# Patient Record
Sex: MALE | Race: WHITE | NOT HISPANIC OR LATINO | URBAN - METROPOLITAN AREA
[De-identification: names, ages, dates, MRNs, and addresses within clinical notes are randomized per-mention and may not be internally consistent; named-entity substitution may affect disease eponyms.]

---

## 2019-09-05 ENCOUNTER — OFFICE VISIT (OUTPATIENT)
Dept: NEPHROLOGY | Facility: CLINIC | Age: 64
End: 2019-09-05
Payer: COMMERCIAL

## 2019-09-05 VITALS
HEIGHT: 73 IN | SYSTOLIC BLOOD PRESSURE: 128 MMHG | WEIGHT: 226 LBS | BODY MASS INDEX: 29.95 KG/M2 | DIASTOLIC BLOOD PRESSURE: 74 MMHG

## 2019-09-05 DIAGNOSIS — I10 ESSENTIAL HYPERTENSION: Primary | ICD-10-CM

## 2019-09-05 DIAGNOSIS — Z12.5 ENCOUNTER FOR PROSTATE CANCER SCREENING: ICD-10-CM

## 2019-09-05 DIAGNOSIS — E78.00 PURE HYPERCHOLESTEROLEMIA: ICD-10-CM

## 2019-09-05 PROBLEM — H91.90 HEARING LOSS: Status: ACTIVE | Noted: 2019-09-05

## 2019-09-05 PROBLEM — N40.0 BENIGN PROSTATIC HYPERPLASIA WITHOUT LOWER URINARY TRACT SYMPTOMS: Status: ACTIVE | Noted: 2019-09-05

## 2019-09-05 PROCEDURE — 3074F SYST BP LT 130 MM HG: CPT | Performed by: INTERNAL MEDICINE

## 2019-09-05 PROCEDURE — 99214 OFFICE O/P EST MOD 30 MIN: CPT | Performed by: INTERNAL MEDICINE

## 2019-09-05 PROCEDURE — 3078F DIAST BP <80 MM HG: CPT | Performed by: INTERNAL MEDICINE

## 2019-09-05 RX ORDER — TAMSULOSIN HYDROCHLORIDE 0.4 MG/1
0.4 CAPSULE ORAL DAILY
COMMUNITY
Start: 2019-08-28 | End: 2019-10-31 | Stop reason: SDUPTHER

## 2019-09-05 RX ORDER — METOPROLOL SUCCINATE 25 MG/1
25 TABLET, EXTENDED RELEASE ORAL DAILY
COMMUNITY
Start: 2019-08-09 | End: 2020-04-28 | Stop reason: SDUPTHER

## 2019-09-05 RX ORDER — LISINOPRIL 10 MG/1
10 TABLET ORAL DAILY
COMMUNITY
Start: 2019-07-24 | End: 2019-10-31 | Stop reason: SDUPTHER

## 2019-09-05 NOTE — PROGRESS NOTES
[facility] [place], [place]    Name: Chandler Hayes  YOB: 1955      Assessment/Plan:    Hearing loss  Wears hearing aids    Benign prostatic hyperplasia without lower urinary tract symptoms  Continue tamsulosin    Encounter for prostate cancer screening  Check a free and total PSA         Problem List Items Addressed This Visit        Other    Encounter for prostate cancer screening     Check a free and total PSA         Relevant Orders    PSA, total and free      Other Visit Diagnoses     Essential hypertension    -  Primary    stable on medications    Relevant Medications    lisinopril (ZESTRIL) 10 mg tablet    metoprolol succinate (TOPROL-XL) 25 mg 24 hr tablet    Other Relevant Orders    CBC and differential    Comprehensive metabolic panel    Urinalysis with microscopic    Microalbumin / creatinine urine ratio    Pure hypercholesterolemia        Relevant Orders    Lipid panel            Subjective:      Patient ID: Chandler Hayes is a 59 y o  male  HPI He is taking lisinopril and metoprolol for hypertension and takes tamsulosin for BPH without lower urinary tract symptoms  He is feeling well and has no side effects  He just retired and feels great  He is exercising and kayaking  The following portions of the patient's history were reviewed and updated as appropriate: allergies, current medications, past family history, past medical history, past social history, past surgical history and problem list     Review of Systems   Constitutional: Positive for activity change and unexpected weight change  Negative for appetite change, chills and fatigue  HENT: Positive for hearing loss  Eyes: Negative  Respiratory: Negative  Cardiovascular: Negative  Gastrointestinal: Negative  Endocrine: Negative  Genitourinary: Negative  Musculoskeletal: Negative  Skin: Negative  Allergic/Immunologic: Negative  Hematological: Negative  Psychiatric/Behavioral: Negative  Social History     Socioeconomic History    Marital status: /Civil Union     Spouse name: None    Number of children: None    Years of education: None    Highest education level: None   Occupational History    None   Social Needs    Financial resource strain: None    Food insecurity:     Worry: None     Inability: None    Transportation needs:     Medical: None     Non-medical: None   Tobacco Use    Smoking status: Never Smoker    Smokeless tobacco: Never Used   Substance and Sexual Activity    Alcohol use: Never     Frequency: Never    Drug use: Never    Sexual activity: None   Lifestyle    Physical activity:     Days per week: None     Minutes per session: None    Stress: None   Relationships    Social connections:     Talks on phone: None     Gets together: None     Attends Yazidi service: None     Active member of club or organization: None     Attends meetings of clubs or organizations: None     Relationship status: None    Intimate partner violence:     Fear of current or ex partner: None     Emotionally abused: None     Physically abused: None     Forced sexual activity: None   Other Topics Concern    None   Social History Narrative    None     History reviewed  No pertinent past medical history  History reviewed  No pertinent surgical history      Current Outpatient Medications:     lisinopril (ZESTRIL) 10 mg tablet, Take 10 mg by mouth daily, Disp: , Rfl:     metoprolol succinate (TOPROL-XL) 25 mg 24 hr tablet, Take 25 mg by mouth daily, Disp: , Rfl:     tamsulosin (FLOMAX) 0 4 mg, Take 0 4 mg by mouth daily, Disp: , Rfl:      Lab Results   Component Value Date     07/07/2015    K 4 2 07/07/2015     07/07/2015    CO2 29 07/07/2015    ANIONGAP 6 07/07/2015    BUN 18 07/07/2015    CREATININE 1 01 07/07/2015    CALCIUM 8 8 07/07/2015    AST 16 07/07/2015    ALT 25 07/07/2015    ALKPHOS 48 07/07/2015    PROT 7 4 07/07/2015 BILITOT 0 43 07/07/2015     Lab Results   Component Value Date    WBC 4 47 07/07/2015    HGB 15 3 07/07/2015    HCT 45 1 07/07/2015    MCV 93 07/07/2015     07/07/2015     No results found for: CHOLESTEROL  No results found for: HDL  No results found for: LDLCALC  No results found for: TRIG  No results found for: CHOLHDL  No results found for: [id number], TSH        Objective:      /74 (BP Location: Right arm, Patient Position: Sitting, Cuff Size: Standard)   Ht 6' 1" (1 854 m)   Wt 103 kg (226 lb)   BMI 29 82 kg/m²          Physical Exam   Constitutional: He is oriented to person, place, and time  He appears well-developed and well-nourished  HENT:   Head: Normocephalic  Right Ear: External ear normal    Left Ear: External ear normal    Nose: Nose normal    Mouth/Throat: Oropharynx is clear and moist    Eyes: Pupils are equal, round, and reactive to light  EOM are normal    Neck: Normal range of motion  Neck supple  No JVD present  No thyromegaly present  Cardiovascular: Normal rate and normal heart sounds  No murmur heard  Pulmonary/Chest: Effort normal  No respiratory distress  He has wheezes  He has no rales  Abdominal: Soft  Bowel sounds are normal  He exhibits no distension  There is no tenderness  Musculoskeletal: Normal range of motion  He exhibits no edema  Neurological: He is alert and oriented to person, place, and time  Skin: Skin is warm and dry  Psychiatric: He has a normal mood and affect   His behavior is normal

## 2019-10-31 DIAGNOSIS — I10 ESSENTIAL HYPERTENSION: Primary | ICD-10-CM

## 2019-10-31 DIAGNOSIS — Z12.5 ENCOUNTER FOR PROSTATE CANCER SCREENING: ICD-10-CM

## 2019-11-01 RX ORDER — TAMSULOSIN HYDROCHLORIDE 0.4 MG/1
0.4 CAPSULE ORAL DAILY
Qty: 100 CAPSULE | Refills: 5 | Status: SHIPPED | OUTPATIENT
Start: 2019-11-01 | End: 2020-10-26 | Stop reason: SDUPTHER

## 2019-11-01 RX ORDER — LISINOPRIL 10 MG/1
10 TABLET ORAL DAILY
Qty: 100 TABLET | Refills: 5 | Status: SHIPPED | OUTPATIENT
Start: 2019-11-01 | End: 2020-10-26 | Stop reason: SDUPTHER

## 2020-04-28 DIAGNOSIS — I10 ESSENTIAL HYPERTENSION: Primary | ICD-10-CM

## 2020-04-28 RX ORDER — METOPROLOL SUCCINATE 25 MG/1
25 TABLET, EXTENDED RELEASE ORAL DAILY
Qty: 100 TABLET | Refills: 3 | Status: SHIPPED | OUTPATIENT
Start: 2020-04-28 | End: 2020-10-26 | Stop reason: SDUPTHER

## 2020-10-26 ENCOUNTER — OFFICE VISIT (OUTPATIENT)
Dept: NEPHROLOGY | Facility: CLINIC | Age: 65
End: 2020-10-26
Payer: COMMERCIAL

## 2020-10-26 ENCOUNTER — TELEPHONE (OUTPATIENT)
Dept: NEPHROLOGY | Facility: CLINIC | Age: 65
End: 2020-10-26

## 2020-10-26 VITALS
HEART RATE: 95 BPM | SYSTOLIC BLOOD PRESSURE: 132 MMHG | OXYGEN SATURATION: 98 % | TEMPERATURE: 97.5 F | HEIGHT: 73 IN | DIASTOLIC BLOOD PRESSURE: 74 MMHG | WEIGHT: 221.4 LBS | BODY MASS INDEX: 29.34 KG/M2

## 2020-10-26 DIAGNOSIS — Z12.5 ENCOUNTER FOR PROSTATE CANCER SCREENING: ICD-10-CM

## 2020-10-26 DIAGNOSIS — Z23 ENCOUNTER FOR ADMINISTRATION OF VACCINE: ICD-10-CM

## 2020-10-26 DIAGNOSIS — E78.00 PURE HYPERCHOLESTEROLEMIA: Primary | ICD-10-CM

## 2020-10-26 DIAGNOSIS — I10 ESSENTIAL HYPERTENSION: ICD-10-CM

## 2020-10-26 PROCEDURE — 90471 IMMUNIZATION ADMIN: CPT | Performed by: INTERNAL MEDICINE

## 2020-10-26 PROCEDURE — 99214 OFFICE O/P EST MOD 30 MIN: CPT | Performed by: INTERNAL MEDICINE

## 2020-10-26 PROCEDURE — 90682 RIV4 VACC RECOMBINANT DNA IM: CPT | Performed by: INTERNAL MEDICINE

## 2020-10-26 RX ORDER — METOPROLOL SUCCINATE 25 MG/1
25 TABLET, EXTENDED RELEASE ORAL DAILY
Qty: 100 TABLET | Refills: 3 | Status: SHIPPED | OUTPATIENT
Start: 2020-10-26 | End: 2022-01-06 | Stop reason: SDUPTHER

## 2020-10-26 RX ORDER — TAMSULOSIN HYDROCHLORIDE 0.4 MG/1
0.4 CAPSULE ORAL DAILY
Qty: 100 CAPSULE | Refills: 5 | Status: SHIPPED | OUTPATIENT
Start: 2020-10-26 | End: 2022-01-03

## 2020-10-26 RX ORDER — LISINOPRIL 10 MG/1
10 TABLET ORAL DAILY
Qty: 100 TABLET | Refills: 5 | Status: SHIPPED | OUTPATIENT
Start: 2020-10-26 | End: 2022-01-06 | Stop reason: SDUPTHER

## 2020-10-27 DIAGNOSIS — Z12.5 ENCOUNTER FOR PROSTATE CANCER SCREENING: Primary | ICD-10-CM

## 2020-10-28 ENCOUNTER — TELEPHONE (OUTPATIENT)
Dept: NEPHROLOGY | Facility: CLINIC | Age: 65
End: 2020-10-28

## 2021-03-10 LAB
CREAT ?TM UR-SCNC: 162.6 UMOL/L
CREAT ?TM UR-SCNC: 162.6 UMOL/L
EXT MICROALBUMIN URINE RANDOM: 4.5
EXT PROTEIN URINE: 11.3
MICROALBUMIN/CREAT UR: 3 MG/G{CREAT}
PROT/CREAT UR: 69 MG/G{CREAT}

## 2021-03-29 ENCOUNTER — TELEPHONE (OUTPATIENT)
Dept: NEPHROLOGY | Facility: CLINIC | Age: 66
End: 2021-03-29

## 2021-03-29 NOTE — TELEPHONE ENCOUNTER
Not spilling any protein in the uri e of significance and PSA is very low  That is all that is there  I do nto see a CMP

## 2022-01-03 DIAGNOSIS — Z12.5 ENCOUNTER FOR PROSTATE CANCER SCREENING: ICD-10-CM

## 2022-01-03 RX ORDER — TAMSULOSIN HYDROCHLORIDE 0.4 MG/1
CAPSULE ORAL
Qty: 100 CAPSULE | Refills: 0 | Status: SHIPPED | OUTPATIENT
Start: 2022-01-03 | End: 2022-01-06 | Stop reason: SDUPTHER

## 2022-01-06 ENCOUNTER — TELEPHONE (OUTPATIENT)
Dept: NEPHROLOGY | Facility: CLINIC | Age: 67
End: 2022-01-06

## 2022-01-06 ENCOUNTER — OFFICE VISIT (OUTPATIENT)
Dept: NEPHROLOGY | Facility: CLINIC | Age: 67
End: 2022-01-06
Payer: MEDICARE

## 2022-01-06 VITALS
HEART RATE: 68 BPM | DIASTOLIC BLOOD PRESSURE: 70 MMHG | SYSTOLIC BLOOD PRESSURE: 120 MMHG | OXYGEN SATURATION: 99 % | WEIGHT: 226 LBS | BODY MASS INDEX: 29.82 KG/M2

## 2022-01-06 DIAGNOSIS — E55.9 VITAMIN D DEFICIENCY: ICD-10-CM

## 2022-01-06 DIAGNOSIS — E78.5 HYPERLIPIDEMIA, UNSPECIFIED HYPERLIPIDEMIA TYPE: ICD-10-CM

## 2022-01-06 DIAGNOSIS — N40.0 BENIGN PROSTATIC HYPERPLASIA WITHOUT LOWER URINARY TRACT SYMPTOMS: ICD-10-CM

## 2022-01-06 DIAGNOSIS — Z12.5 ENCOUNTER FOR PROSTATE CANCER SCREENING: ICD-10-CM

## 2022-01-06 DIAGNOSIS — Z12.5 ENCOUNTER FOR PROSTATE CANCER SCREENING: Primary | ICD-10-CM

## 2022-01-06 DIAGNOSIS — H91.93 BILATERAL HEARING LOSS, UNSPECIFIED HEARING LOSS TYPE: ICD-10-CM

## 2022-01-06 DIAGNOSIS — I10 ESSENTIAL HYPERTENSION: Primary | ICD-10-CM

## 2022-01-06 PROCEDURE — 99214 OFFICE O/P EST MOD 30 MIN: CPT | Performed by: INTERNAL MEDICINE

## 2022-01-06 RX ORDER — LISINOPRIL 10 MG/1
10 TABLET ORAL DAILY
Qty: 90 TABLET | Refills: 5 | Status: SHIPPED | OUTPATIENT
Start: 2022-01-06

## 2022-01-06 RX ORDER — METOPROLOL SUCCINATE 25 MG/1
25 TABLET, EXTENDED RELEASE ORAL DAILY
Qty: 90 TABLET | Refills: 3 | Status: SHIPPED | OUTPATIENT
Start: 2022-01-06

## 2022-01-06 RX ORDER — TAMSULOSIN HYDROCHLORIDE 0.4 MG/1
0.4 CAPSULE ORAL DAILY
Qty: 90 CAPSULE | Refills: 3
Start: 2022-01-06 | End: 2022-05-03

## 2022-01-06 NOTE — PROGRESS NOTES
[facility] [place], [place]    Name: Mona Weeks  YOB: 1955      Assessment/Plan:    Civid X 2 Moderna and booster  Had flu shot at [facility]        Problem List Items Addressed This Visit        Cardiovascular and Mediastinum    Essential hypertension - Primary     Blood pressure is ranging between 120-130  At home            Nervous and Auditory    Hearing loss       Genitourinary    Benign prostatic hyperplasia without lower urinary tract symptoms     He follows with urology and has complete bladder emptying                 Subjective:      Patient ID: Mona Weeks is a 73 y o  male  HPI    The following portions of the patient's history were reviewed and updated as appropriate: allergies, current medications, past family history, past medical history, past social history, past surgical history and problem list     Review of Systems   Constitutional: Negative for fatigue  HENT: Positive for hearing loss  Eyes: Negative for visual disturbance  Respiratory: Negative for cough and shortness of breath  Cardiovascular: Negative for chest pain, palpitations and leg swelling  Gastrointestinal: Negative for abdominal pain, blood in stool, constipation and diarrhea  Will have a colonoscopy this year   Genitourinary: Negative for difficulty urinating, dysuria and frequency  Occasional urgency   Musculoskeletal: Positive for arthralgias  Negative for back pain  Neurological: Positive for dizziness and light-headedness  Negative for weakness  Hematological: Does not bruise/bleed easily  Psychiatric/Behavioral: Negative for dysphoric mood           Social History     Socioeconomic History    Marital status: /Civil Union     Spouse name: None    Number of children: None    Years of education: None    Highest education level: None   Occupational History    Occupation: Teacher    Tobacco Use    Smoking status: Never Smoker    Smokeless tobacco: Never Used   Substance and Sexual Activity    Alcohol use: Never    Drug use: Never    Sexual activity: None   Other Topics Concern    None   Social History Narrative    None     Social Determinants of Health     Financial Resource Strain: Not on file   Food Insecurity: Not on file   Transportation Needs: Not on file   Physical Activity: Not on file   Stress: Not on file   Social Connections: Not on file   Intimate Partner Violence: Not on file   Housing Stability: Not on file     Past Medical History:   Diagnosis Date    Benign renovascular hypertension     Hyperlipidemia      Past Surgical History:   Procedure Laterality Date    COLONOSCOPY  2007    Normal        Current Outpatient Medications:     lisinopril (ZESTRIL) 10 mg tablet, Take 1 tablet (10 mg total) by mouth daily, Disp: 100 tablet, Rfl: 5    metoprolol succinate (TOPROL-XL) 25 mg 24 hr tablet, Take 1 tablet (25 mg total) by mouth daily, Disp: 100 tablet, Rfl: 3    tamsulosin (FLOMAX) 0 4 mg, TAKE ONE CAPSULE BY MOUTH ONE TIME DAILY, Disp: 100 capsule, Rfl: 0    Lab Results   Component Value Date     07/07/2015    K 4 2 07/07/2015     07/07/2015    CO2 29 07/07/2015    ANIONGAP 6 07/07/2015    BUN 18 07/07/2015    CREATININE 1 01 07/07/2015    CALCIUM 8 8 07/07/2015    AST 16 07/07/2015    ALT 25 07/07/2015    ALKPHOS 48 07/07/2015    PROT 7 4 07/07/2015    BILITOT 0 43 07/07/2015     Lab Results   Component Value Date    WBC 4 47 07/07/2015    HGB 15 3 07/07/2015    HCT 45 1 07/07/2015    MCV 93 07/07/2015     07/07/2015     No results found for: CHOLESTEROL  No results found for: HDL  No results found for: LDLCALC  No results found for: TRIG  No results found for: CHOLHDL  No results found for: [id number], TSH  Lab Results   Component Value Date    CALCIUM 8 8 07/07/2015     No results found for: SPEP, UPEP  No results found for: SOLE DOVEHUR        Objective:      /70   Pulse 68   Wt 103 kg (226 lb)   SpO2 99%   BMI 29 82 kg/m²     138/80     Physical Exam  Constitutional:       General: He is not in acute distress  Appearance: He is normal weight  He is not toxic-appearing  HENT:      Head: Normocephalic and atraumatic  Right Ear: External ear normal       Left Ear: External ear normal    Eyes:      Extraocular Movements: Extraocular movements intact  Pupils: Pupils are equal, round, and reactive to light  Neck:      Vascular: No carotid bruit  Cardiovascular:      Rate and Rhythm: Normal rate and regular rhythm  Pulmonary:      Effort: Pulmonary effort is normal  No respiratory distress  Breath sounds: Normal breath sounds  No wheezing or rales  Abdominal:      General: Bowel sounds are normal  There is no distension  Palpations: Abdomen is soft  Tenderness: There is no abdominal tenderness  Musculoskeletal:      Cervical back: Normal range of motion  Lymphadenopathy:      Cervical: No cervical adenopathy  Neurological:      General: No focal deficit present  Mental Status: He is alert  Gait: Gait normal    Psychiatric:         Mood and Affect: Mood normal          Behavior: Behavior normal          Thought Content:  Thought content normal          Judgment: Judgment normal

## 2022-01-18 ENCOUNTER — TELEPHONE (OUTPATIENT)
Dept: OTHER | Facility: OTHER | Age: 67
End: 2022-01-18

## 2022-01-18 NOTE — TELEPHONE ENCOUNTER
Patient saying that he still not have received the script for his PSA and is asking if the office can give him a call regarding this matter

## 2022-01-26 ENCOUNTER — OFFICE VISIT (OUTPATIENT)
Dept: FAMILY MEDICINE CLINIC | Facility: CLINIC | Age: 67
End: 2022-01-26
Payer: MEDICARE

## 2022-01-26 VITALS
DIASTOLIC BLOOD PRESSURE: 80 MMHG | SYSTOLIC BLOOD PRESSURE: 140 MMHG | WEIGHT: 223.8 LBS | OXYGEN SATURATION: 99 % | HEART RATE: 60 BPM | TEMPERATURE: 97.2 F | BODY MASS INDEX: 30.31 KG/M2 | HEIGHT: 72 IN

## 2022-01-26 DIAGNOSIS — Z00.00 ENCOUNTER FOR MEDICAL EXAMINATION TO ESTABLISH CARE: ICD-10-CM

## 2022-01-26 DIAGNOSIS — I10 ESSENTIAL HYPERTENSION: Primary | ICD-10-CM

## 2022-01-26 DIAGNOSIS — Z86.010 HISTORY OF COLON POLYPS: ICD-10-CM

## 2022-01-26 DIAGNOSIS — M25.512 ACUTE PAIN OF LEFT SHOULDER: ICD-10-CM

## 2022-01-26 DIAGNOSIS — N40.0 BENIGN PROSTATIC HYPERPLASIA WITHOUT LOWER URINARY TRACT SYMPTOMS: ICD-10-CM

## 2022-01-26 PROCEDURE — 99204 OFFICE O/P NEW MOD 45 MIN: CPT | Performed by: PHYSICIAN ASSISTANT

## 2022-01-26 NOTE — PROGRESS NOTES
Assessment/Plan:    No problem-specific Assessment & Plan notes found for this encounter  Problem List Items Addressed This Visit        Cardiovascular and Mediastinum    Essential hypertension - Primary       Genitourinary    Benign prostatic hyperplasia without lower urinary tract symptoms      Other Visit Diagnoses     Acute pain of left shoulder        Relevant Orders    Ambulatory Referral to Physical Therapy    History of colon polyps        Relevant Orders    Ambulatory Referral to Gastroenterology    Encounter for medical examination to establish care                Subjective:      Patient ID: Aida Barrett is a 73 y o  male  Patient presents to establish care today  Patient has been following Dr Luann Reno for his renovascular HTN- well controlled on metoprolol and lisinopril  Patient had labs put on earlier this month he will have done  Bilateral shoulder pain- left worse with significant decreased range of motion of the left shoulder  States it is painful at night he cannot sleep on his left side  He does share he carries a heavy camera on left side as he is into photography  He has changed this habbit and been distributing the weight more around his neck  Denies paresthesias   Denies neck pain   Denies injury          The following portions of the patient's history were reviewed and updated as appropriate: allergies, current medications, past family history, past medical history, past surgical history and problem list     Review of Systems   Constitutional: Negative for chills, fatigue and fever  HENT: Negative for congestion, ear pain, sinus pain, sore throat and trouble swallowing  Eyes: Negative for pain, discharge and redness  Respiratory: Negative for cough, chest tightness, shortness of breath and wheezing  Cardiovascular: Negative for chest pain, palpitations and leg swelling  Gastrointestinal: Negative for abdominal pain, diarrhea, nausea and vomiting     Musculoskeletal: Positive for arthralgias (b/l shoulder pain- left worse than right)  Negative for joint swelling and myalgias  Skin: Negative for rash  Neurological: Negative for dizziness, weakness, numbness and headaches  Objective:      /80 (BP Location: Left arm, Patient Position: Sitting, Cuff Size: Large)   Pulse 60   Temp (!) 97 2 °F (36 2 °C)   Ht 6' (1 829 m)   Wt 102 kg (223 lb 12 8 oz)   SpO2 99%   BMI 30 35 kg/m²          Physical Exam  Constitutional:       General: He is not in acute distress  Appearance: He is well-developed  Cardiovascular:      Rate and Rhythm: Normal rate and regular rhythm  Heart sounds: Normal heart sounds  Pulmonary:      Effort: Pulmonary effort is normal       Breath sounds: Normal breath sounds  Musculoskeletal:      Right shoulder: Normal       Left shoulder: Decreased range of motion  Right upper arm: Normal       Left upper arm: Normal       Cervical back: Normal       Thoracic back: Normal       Lumbar back: Normal  No deformity

## 2022-01-26 NOTE — PROGRESS NOTES
BMI Counseling: Body mass index is 30 35 kg/m²  The BMI is above normal  Nutrition recommendations include reducing portion sizes and 3-5 servings of fruits/vegetables daily  Exercise recommendations include exercising 3-5 times per week

## 2022-02-03 ENCOUNTER — EVALUATION (OUTPATIENT)
Dept: PHYSICAL THERAPY | Facility: CLINIC | Age: 67
End: 2022-02-03
Payer: MEDICARE

## 2022-02-03 DIAGNOSIS — M25.512 ACUTE PAIN OF LEFT SHOULDER: Primary | ICD-10-CM

## 2022-02-03 PROCEDURE — 97140 MANUAL THERAPY 1/> REGIONS: CPT | Performed by: PHYSICAL THERAPIST

## 2022-02-03 PROCEDURE — 97110 THERAPEUTIC EXERCISES: CPT | Performed by: PHYSICAL THERAPIST

## 2022-02-03 PROCEDURE — 97161 PT EVAL LOW COMPLEX 20 MIN: CPT | Performed by: PHYSICAL THERAPIST

## 2022-02-03 NOTE — PROGRESS NOTES
PT Evaluation     Today's date: 2/3/2022  Patient name: Darryl Chen  : 1955  MRN: [id number]  Referring provider: Nathan Alaniz*  Dx:   Encounter Diagnosis     ICD-10-CM    1  Acute pain of left shoulder  M25 512                   Assessment  Assessment details: Darryl Chen is a 73 y o  male referred with primary diagnosis of Acute pain of left shoulder  (primary encounter diagnosis)   Patient presents with the following functional limitations: Pain with reaching behind back, overhead activity, lying on side, and heavier lifting  Symptoms consistent with adhesive capsullitis  Patient demonstrates significant ROM limitations in a capsular pattern  Treatment to include: Manual therapy techniques, extremity/core strengthening, neuromuscular control exercises, instruction in a comprehensive HEP, and modalities as needed  They will benefit from skilled PT services to address the above functional deficits and to decrease pain to promote a return to their premorbid level of function  Impairments: abnormal or restricted ROM, activity intolerance, pain with function and poor posture   Functional limitations: Pain with reaching behind back, overhead activity, lying on side, and heavier lifting  Understanding of Dx/Px/POC: good   Prognosis: good    Goals  STG (6 weeks)  1  Patient will demonstrate 50% gains in left shoulder ROM  2  Patient will report pain as a 3-4/10 with overhead activity  LTG (12 weeks)  1  Patient will demonstrate left shoulder ROM WFL to reach behind back and overhead without pain  2  Patient will report the ability to sleep on his side without pain  3  Patient will report the ability to carry and use his camera without increased shoulder pain  4  Patient will report pain as a 0-1/10 at worst with normal use of his left shoulder  5  Patient will be independent and compliant with a Hep in order to maintain gains made with skilled PT services      Plan  Patient would benefit from: skilled physical therapy  Planned modality interventions: cryotherapy  Planned therapy interventions: joint mobilization, manual therapy, neuromuscular re-education, patient education, postural training, strengthening, stretching, therapeutic activities, therapeutic exercise and home exercise program  Frequency: 2x week  Duration in weeks: 12  Plan of Care beginning date: 2/3/2022  Plan of Care expiration date: 2022  Treatment plan discussed with: patient        Subjective Evaluation    History of Present Illness  Mechanism of injury: Patient states he took up birding as a hobby about 2-3 years ago  He carries his camera in his left arm and he feels that carrying the heavy camera may have caused his shoulder injury  Symptoms have gotten progressively worse over time  About 3 weeks ago he began wearing his camera around his neck and the shoulder has started feeling better  Patient saw his PCP office and is referred now to outpatient PT services  Pain  Current pain ratin  At best pain ratin  At worst pain ratin  Location: Left superior shoulder  Quality: sharp and dull ache  Progression: worsening    Social Support  Lives with: spouse    Employment status: not working (Retired)  Hand dominance: right      Diagnostic Tests  No diagnostic tests performed        Objective     Static Posture     Shoulders  Rounded  Scapulae  Left protracted and right protracted  Postural Observations  Seated posture: fair        Tenderness     Left Shoulder   Tenderness in the biceps tendon (proximal)  No tenderness in the [facility] joint, acromion, bicipital groove, clavicle, coracoid process, infraspinatus tendon, subscapularis tendon and supraspinatus tendon       Active Range of Motion   Left Shoulder   Flexion: 123 degrees with pain  Abduction: 92 degrees   External rotation 45°: 45 degrees with pain  Internal rotation 45°: 55 degrees     Right Shoulder   Flexion: 167 degrees   Abduction: 165 degrees   External rotation 90°: 90 degrees    Passive Range of Motion   Left Shoulder   Flexion: 125 degrees with pain  Abduction: 125 degrees   External rotation 45°: 50 degrees with pain  Internal rotation 45°: 62 degrees with pain    Right Shoulder   External rotation 45°: 90 degrees     Strength/Myotome Testing     Left Shoulder     Planes of Motion   Flexion: 4+   Abduction: 4+   External rotation at 0°: 4-   Internal rotation at 0°: 4+     Tests     Left Shoulder   Positive Speed's  Negative AC shear, empty can and painful arc       Additional Tests Details  Capsular end-feel all shoulder motions             Precautions: benign renovascular HTN      Manuals 2/3            PROM left shoulder JF            GH mobs Inf, post                                       Neuro Re-Ed             Webslide rows M,L             Prone I,Y,T                                                                              Ther Ex             UBE retro             T-band shoulder IR/ER             Wall slides flexion, scaption Instructed            Wall sleeper stretch             Table slides flexion, scaption and ER Instructed            Cane flexion, abd, IR and ext             Stand post  Capsule stretch                          Ther Activity                                       Gait Training                                       Modalities

## 2022-02-07 ENCOUNTER — OFFICE VISIT (OUTPATIENT)
Dept: PHYSICAL THERAPY | Facility: CLINIC | Age: 67
End: 2022-02-07
Payer: MEDICARE

## 2022-02-07 DIAGNOSIS — M25.512 ACUTE PAIN OF LEFT SHOULDER: Primary | ICD-10-CM

## 2022-02-07 PROCEDURE — 97110 THERAPEUTIC EXERCISES: CPT | Performed by: PHYSICAL THERAPIST

## 2022-02-07 PROCEDURE — 97140 MANUAL THERAPY 1/> REGIONS: CPT | Performed by: PHYSICAL THERAPIST

## 2022-02-07 NOTE — PROGRESS NOTES
Daily Note     Today's date: 2022  Patient name: Jose Mercer  : 1955  MRN: [id number]  Referring provider: Tj Drew*  Dx:   Encounter Diagnosis     ICD-10-CM    1  Acute pain of left shoulder  M25 512                   Subjective: Has been exercising daily since IE      Objective: See treatment diary below      Assessment: Tolerated treatment well  Patient would benefit from continued PT  Pain with end-range stretching  ROM improved today  Added new TE for shoulder stretching  Plan: Continue per plan of care  Precautions: benign renovascular HTN  Access Code: [facility]  URL: [email or web address]  Date: 2022  Prepared by: Aquiles Luo       Manuals 2/3 2/7           PROM left shoulder JF            GH mobs Inf, post                                       Neuro Re-Ed             Webslide rows M,L  BTB 2x10           Prone I,Y,T             Prone row  x20                                                               Ther Ex             UBE retro  L3x4' retro           T-band shoulder IR/ER  BTB x20           Wall pulleys  5" x20           Wall slides flexion, scaption Instructed            Wall sleeper stretch  3x30"           Table slides flexion, scaption and ER Instructed            Cane flexion, abd, IR and ext             Stand post  Capsule stretch  3x30"                        Ther Activity                                       Gait Training                                       Modalities

## 2022-02-09 ENCOUNTER — OFFICE VISIT (OUTPATIENT)
Dept: PHYSICAL THERAPY | Facility: CLINIC | Age: 67
End: 2022-02-09
Payer: MEDICARE

## 2022-02-09 DIAGNOSIS — M25.512 ACUTE PAIN OF LEFT SHOULDER: Primary | ICD-10-CM

## 2022-02-09 PROCEDURE — 97110 THERAPEUTIC EXERCISES: CPT | Performed by: PHYSICAL THERAPIST

## 2022-02-09 PROCEDURE — 97140 MANUAL THERAPY 1/> REGIONS: CPT | Performed by: PHYSICAL THERAPIST

## 2022-02-09 PROCEDURE — 97112 NEUROMUSCULAR REEDUCATION: CPT | Performed by: PHYSICAL THERAPIST

## 2022-02-09 NOTE — PROGRESS NOTES
Daily Note     Today's date: 2022  Patient name: Darryl Chen  : 1955  MRN: [id number]  Referring provider: Nathan Alaniz*  Dx:   Encounter Diagnosis     ICD-10-CM    1  Acute pain of left shoulder  M25 512                   Subjective: Patient reports shoulder has been feeling better since starting PT  He can now open and close his car door easier without pain  Objective: See treatment diary below      Assessment: Tolerated treatment well  Patient would benefit from continued PT  Shoulder ROM is progressing, as is strength  Overall, pain is decreasing  Able to twist the faucet on his tub without pain also      Plan: Continue per plan of care  Precautions: benign renovascular HTN  Access Code: [facility]  URL: [email or web address]  Date: 2022  Prepared by: Hugo Harding       Manuals 2/3 2/7 2/9          PROM left shoulder JF JF TAYLOR MIGUEL mobs Inf, post                                       Neuro Re-Ed             Webslide rows M,L  BTB 2x10 BTB 2x10          Prone I,Y,T   2x10          Prone row  x20 x20                                                              Ther Ex             UBE retro  L3x4' retro L4x4'          T-band shoulder IR/ER  BTB x20 BTB x20          Wall pulleys  5" x20 5" x20          Wall slides flexion, scaption Instructed            Wall sleeper stretch  3x30"           Table slides flexion, scaption and ER Instructed            Cane flexion, abd, IR and ext             Stand post  Capsule stretch  3x30"           Corner pec stretch   3x30"          Ther Activity                                       Gait Training                                       Modalities

## 2022-02-14 ENCOUNTER — OFFICE VISIT (OUTPATIENT)
Dept: PHYSICAL THERAPY | Facility: CLINIC | Age: 67
End: 2022-02-14
Payer: MEDICARE

## 2022-02-14 DIAGNOSIS — M25.512 ACUTE PAIN OF LEFT SHOULDER: Primary | ICD-10-CM

## 2022-02-14 PROCEDURE — 97140 MANUAL THERAPY 1/> REGIONS: CPT | Performed by: PHYSICAL THERAPIST

## 2022-02-14 PROCEDURE — 97110 THERAPEUTIC EXERCISES: CPT | Performed by: PHYSICAL THERAPIST

## 2022-02-14 NOTE — PROGRESS NOTES
Daily Note     Today's date: 2022  Patient name: Germania Gayle  : 1955  MRN: [id number]  Referring provider: Stephanie Canela*  Dx:   Encounter Diagnosis     ICD-10-CM    1  Acute pain of left shoulder  M25 512                   Subjective: Patient states his shoulder continues to feel better and better  Stretching daily  Objective: See treatment diary below      Assessment: Tolerated treatment well  Patient would benefit from continued PT    ROM is improving  Pain is decreasing  Plan: Continue per plan of care  Precautions: benign renovascular HTN  Access Code: [facility]  URL: [email or web address]  Date: 2022  Prepared by: Yeimi Echevarria       Manuals 2/3 2/7 2/9 2/14         PROM left shoulder JF JF JF JF         GH mobs Inf, post                                       Neuro Re-Ed             Webslide rows M,L  BTB 2x10 BTB 2x10 BTB 2x15         Prone I,Y,T   2x10          Prone row  x20 x20                                                              Ther Ex             UBE retro  L3x4' retro L4x4' L4x4'         T-band shoulder IR/ER  BTB x20 BTB x20 BTB x30         Wall pulleys  5" x20 5" x20 5" x20         Wall slides flexion, scaption Instructed   x20 ea         Wall sleeper stretch  3x30"  3x30"         Table slides flexion, scaption and ER Instructed            Cane flexion, abd, IR and ext             Stand post  Capsule stretch  3x30"  3x30"         Corner pec stretch   3x30" 3x30"         Ther Activity                                       Gait Training                                       Modalities

## 2022-02-15 NOTE — PROGRESS NOTES
Daily Note     Today's date: 2/15/2022  Patient name: Camila Elizabeth  : 1955  MRN: [id number]  Referring provider: Dean Qureshi*  Dx:   Encounter Diagnosis     ICD-10-CM    1  Acute pain of left shoulder  M25 512                   Subjective: Patient reports continuing to feel better  He has noticed it is easier to reach behind his back to tuck in a shirt  Performs stretches daily at home  Objective: See treatment diary below      Assessment: Tolerated treatment well  Patient would benefit from continued PT   VC's for correct technique with some TE today  Shoulder fatigue  PROM gradually improving  Plan: Continue per plan of care  Precautions: benign renovascular HTN  Access Code: [facility]  URL: [email or web address]  Date: 2022  Prepared by: Riley [employer]       Manuals 2/3 2/7 2/9 2/14 2/16        PROM left shoulder JF JF JF JF         GH mobs Inf, post                                       Neuro Re-Ed             Webslide rows M,L  BTB 2x10 BTB 2x10 BTB 2x15 BTB 2x15        Prone I,Y,T   2x10          Prone row  x20 x20                                                              Ther Ex             UBE retro  L3x4' retro L4x4' L4x4' L4x4'        T-band shoulder IR/ER  BTB x20 BTB x20 BTB x30 BTB x30        Wall pulleys  5" x20 5" x20 5" x20 5" x20        Wall slides flexion, scaption Instructed   x20 ea x20 ea        Wall sleeper stretch  3x30"  3x30" 3x30"        Table slides flexion, scaption and ER Instructed            Cane flexion, abd, IR and ext             Stand post  Capsule stretch  3x30"  3x30" 3x30"        Corner pec stretch   3x30" 3x30" 3x30"        Ther Activity                                       Gait Training                                       Modalities

## 2022-02-16 ENCOUNTER — OFFICE VISIT (OUTPATIENT)
Dept: PHYSICAL THERAPY | Facility: CLINIC | Age: 67
End: 2022-02-16
Payer: MEDICARE

## 2022-02-16 DIAGNOSIS — M25.512 ACUTE PAIN OF LEFT SHOULDER: Primary | ICD-10-CM

## 2022-02-16 PROCEDURE — 97140 MANUAL THERAPY 1/> REGIONS: CPT | Performed by: PHYSICAL THERAPIST

## 2022-02-16 PROCEDURE — 97110 THERAPEUTIC EXERCISES: CPT | Performed by: PHYSICAL THERAPIST

## 2022-02-21 ENCOUNTER — OFFICE VISIT (OUTPATIENT)
Dept: PHYSICAL THERAPY | Facility: CLINIC | Age: 67
End: 2022-02-21
Payer: MEDICARE

## 2022-02-21 DIAGNOSIS — M25.512 ACUTE PAIN OF LEFT SHOULDER: Primary | ICD-10-CM

## 2022-02-21 PROCEDURE — 97140 MANUAL THERAPY 1/> REGIONS: CPT | Performed by: PHYSICAL THERAPIST

## 2022-02-21 PROCEDURE — 97112 NEUROMUSCULAR REEDUCATION: CPT | Performed by: PHYSICAL THERAPIST

## 2022-02-21 PROCEDURE — 97110 THERAPEUTIC EXERCISES: CPT | Performed by: PHYSICAL THERAPIST

## 2022-02-21 NOTE — PROGRESS NOTES
Daily Note     Today's date: 2022  Patient name: Irina Will  : 1955  MRN: [id number]  Referring provider: Temitope Saeed*  Dx:   Encounter Diagnosis     ICD-10-CM    1  Acute pain of left shoulder  M25 512                   Subjective: Patient states he is feeling the same  Objective: See treatment diary below      Assessment: Tolerated treatment well  Patient would benefit from continued PT  Added [address] joint mobs and pec minor release today to address shoulder tightness  ER continues to be very limited  Plan: Continue per plan of care  Precautions: benign renovascular HTN  Access Code: [facility]  URL: [email or web address]  Date: 2022  Prepared by: Lucia Min       Manuals 2/3 2/7 2/9 2/14 2/16 2/21       PROM left shoulder JF JF JF JF JF JF       GH mobs Inf, post      JF       Pec minor release      JF                    Neuro Re-Ed             Webslide rows M,L  BTB 2x10 BTB 2x10 BTB 2x15 BTB 2x15 BTB 2x15       Prone I,Y,T   2x10          Prone row  x20 x20                                                              Ther Ex             UBE retro  L3x4' retro L4x4' L4x4' L4x4' L4x8  F/R       T-band shoulder IR/ER  BTB x20 BTB x20 BTB x30 BTB x30 BTB x30       Wall pulleys  5" x20 5" x20 5" x20 5" x20        Wall slides flexion, scaption Instructed   x20 ea x20 ea x20 ea       Wall sleeper stretch  3x30"  3x30" 3x30" 3x30"       Table slides flexion, scaption and ER Instructed            Cane flexion, abd, IR and ext             Stand post  Capsule stretch  3x30"  3x30" 3x30" 3x30"       Corner pec stretch   3x30" 3x30" 3x30" 3x30"       Ther Activity                                       Gait Training                                       Modalities

## 2022-02-23 ENCOUNTER — APPOINTMENT (OUTPATIENT)
Dept: LAB | Facility: CLINIC | Age: 67
End: 2022-02-23
Payer: MEDICARE

## 2022-02-23 ENCOUNTER — OFFICE VISIT (OUTPATIENT)
Dept: PHYSICAL THERAPY | Facility: CLINIC | Age: 67
End: 2022-02-23
Payer: MEDICARE

## 2022-02-23 DIAGNOSIS — E55.9 VITAMIN D DEFICIENCY: ICD-10-CM

## 2022-02-23 DIAGNOSIS — Z12.5 ENCOUNTER FOR PROSTATE CANCER SCREENING: ICD-10-CM

## 2022-02-23 DIAGNOSIS — M25.512 ACUTE PAIN OF LEFT SHOULDER: Primary | ICD-10-CM

## 2022-02-23 DIAGNOSIS — I10 ESSENTIAL HYPERTENSION: ICD-10-CM

## 2022-02-23 DIAGNOSIS — E78.5 HYPERLIPIDEMIA, UNSPECIFIED HYPERLIPIDEMIA TYPE: ICD-10-CM

## 2022-02-23 LAB
25(OH)D3 SERPL-MCNC: 25 NG/ML (ref 30–100)
ALBUMIN SERPL BCP-MCNC: 3.8 G/DL (ref 3.5–5)
ALP SERPL-CCNC: 39 U/L (ref 46–116)
ALT SERPL W P-5'-P-CCNC: 26 U/L (ref 12–78)
ANION GAP SERPL CALCULATED.3IONS-SCNC: 5 MMOL/L (ref 4–13)
AST SERPL W P-5'-P-CCNC: 16 U/L (ref 5–45)
BACTERIA UR QL AUTO: NORMAL /HPF
BASOPHILS # BLD AUTO: 0.03 THOUSANDS/ΜL (ref 0–0.1)
BASOPHILS NFR BLD AUTO: 1 % (ref 0–1)
BILIRUB SERPL-MCNC: 0.59 MG/DL (ref 0.2–1)
BILIRUB UR QL STRIP: NEGATIVE
BUN SERPL-MCNC: 14 MG/DL (ref 5–25)
CALCIUM SERPL-MCNC: 9 MG/DL (ref 8.3–10.1)
CHLORIDE SERPL-SCNC: 109 MMOL/L (ref 100–108)
CHOLEST SERPL-MCNC: 195 MG/DL
CLARITY UR: NORMAL
CO2 SERPL-SCNC: 25 MMOL/L (ref 21–32)
COLOR UR: YELLOW
CREAT SERPL-MCNC: 1 MG/DL (ref 0.6–1.3)
CREAT UR-MCNC: 245 MG/DL
CREAT UR-MCNC: 245 MG/DL
EOSINOPHIL # BLD AUTO: 0.04 THOUSAND/ΜL (ref 0–0.61)
EOSINOPHIL NFR BLD AUTO: 1 % (ref 0–6)
ERYTHROCYTE [DISTWIDTH] IN BLOOD BY AUTOMATED COUNT: 12.8 % (ref 11.6–15.1)
GFR SERPL CREATININE-BSD FRML MDRD: 78 ML/MIN/1.73SQ M
GLUCOSE P FAST SERPL-MCNC: 104 MG/DL (ref 65–99)
GLUCOSE UR STRIP-MCNC: NEGATIVE MG/DL
HCT VFR BLD AUTO: 46.6 % (ref 36.5–49.3)
HDLC SERPL-MCNC: 42 MG/DL
HGB BLD-MCNC: 15.5 G/DL (ref 12–17)
HGB UR QL STRIP.AUTO: NEGATIVE
HYALINE CASTS #/AREA URNS LPF: NORMAL /LPF
IMM GRANULOCYTES # BLD AUTO: 0.01 THOUSAND/UL (ref 0–0.2)
IMM GRANULOCYTES NFR BLD AUTO: 0 % (ref 0–2)
KETONES UR STRIP-MCNC: NEGATIVE MG/DL
LDLC SERPL CALC-MCNC: 130 MG/DL (ref 0–100)
LEUKOCYTE ESTERASE UR QL STRIP: NEGATIVE
LYMPHOCYTES # BLD AUTO: 1.36 THOUSANDS/ΜL (ref 0.6–4.47)
LYMPHOCYTES NFR BLD AUTO: 33 % (ref 14–44)
MCH RBC QN AUTO: 32.2 PG (ref 26.8–34.3)
MCHC RBC AUTO-ENTMCNC: 33.3 G/DL (ref 31.4–37.4)
MCV RBC AUTO: 97 FL (ref 82–98)
MICROALBUMIN UR-MCNC: 7.9 MG/L (ref 0–20)
MICROALBUMIN/CREAT 24H UR: 3 MG/G CREATININE (ref 0–30)
MONOCYTES # BLD AUTO: 0.6 THOUSAND/ΜL (ref 0.17–1.22)
MONOCYTES NFR BLD AUTO: 15 % (ref 4–12)
NEUTROPHILS # BLD AUTO: 2.11 THOUSANDS/ΜL (ref 1.85–7.62)
NEUTS SEG NFR BLD AUTO: 50 % (ref 43–75)
NITRITE UR QL STRIP: NEGATIVE
NON-SQ EPI CELLS URNS QL MICRO: NORMAL /HPF
NONHDLC SERPL-MCNC: 153 MG/DL
NRBC BLD AUTO-RTO: 0 /100 WBCS
PH UR STRIP.AUTO: 6 [PH]
PLATELET # BLD AUTO: 199 THOUSANDS/UL (ref 149–390)
PMV BLD AUTO: 11 FL (ref 8.9–12.7)
POTASSIUM SERPL-SCNC: 4.2 MMOL/L (ref 3.5–5.3)
PROT SERPL-MCNC: 7.5 G/DL (ref 6.4–8.2)
PROT UR STRIP-MCNC: NEGATIVE MG/DL
PROT UR-MCNC: 6 MG/DL
PROT/CREAT UR: 0.02 MG/G{CREAT} (ref 0–0.1)
RBC # BLD AUTO: 4.82 MILLION/UL (ref 3.88–5.62)
RBC #/AREA URNS AUTO: NORMAL /HPF
SODIUM SERPL-SCNC: 139 MMOL/L (ref 136–145)
SP GR UR STRIP.AUTO: >=1.03 (ref 1–1.03)
TRIGL SERPL-MCNC: 114 MG/DL
URATE SERPL-MCNC: 5 MG/DL (ref 4.2–8)
UROBILINOGEN UR QL STRIP.AUTO: 0.2 E.U./DL
WBC # BLD AUTO: 4.15 THOUSAND/UL (ref 4.31–10.16)
WBC #/AREA URNS AUTO: NORMAL /HPF

## 2022-02-23 PROCEDURE — 82306 VITAMIN D 25 HYDROXY: CPT

## 2022-02-23 PROCEDURE — 97110 THERAPEUTIC EXERCISES: CPT

## 2022-02-23 PROCEDURE — 84550 ASSAY OF BLOOD/URIC ACID: CPT

## 2022-02-23 PROCEDURE — 82570 ASSAY OF URINE CREATININE: CPT

## 2022-02-23 PROCEDURE — 97140 MANUAL THERAPY 1/> REGIONS: CPT

## 2022-02-23 PROCEDURE — 80053 COMPREHEN METABOLIC PANEL: CPT

## 2022-02-23 PROCEDURE — 81001 URINALYSIS AUTO W/SCOPE: CPT

## 2022-02-23 PROCEDURE — 85025 COMPLETE CBC W/AUTO DIFF WBC: CPT

## 2022-02-23 PROCEDURE — 82043 UR ALBUMIN QUANTITATIVE: CPT

## 2022-02-23 PROCEDURE — 84156 ASSAY OF PROTEIN URINE: CPT

## 2022-02-23 PROCEDURE — 84153 ASSAY OF PSA TOTAL: CPT

## 2022-02-23 PROCEDURE — 36415 COLL VENOUS BLD VENIPUNCTURE: CPT

## 2022-02-23 PROCEDURE — 80061 LIPID PANEL: CPT

## 2022-02-23 PROCEDURE — G0103 PSA SCREENING: HCPCS

## 2022-02-23 NOTE — PROGRESS NOTES
Daily Note     Today's date: 2022  Patient name: Cheyenne Woodson  : 1955  MRN: [id number]  Referring provider: Meenu Rose*  Dx: No diagnosis found  Subjective: Pt noted no other pain than the usual        Objective: See treatment diary below      Assessment:  Continued with treatment session, Overall patient required some cues for proper performance  Tolerated treatment fair  Patient exhibited good technique with therapeutic exercises and would benefit from continued PT  S/p treatment session,  Pt noted some soreness but no increase in pain  Educated on HEP importance and DOMS  Plan: Continue per plan of care  Precautions: benign renovascular HTN  Access Code: [facility]  URL: [email or web address]  Date: 2022  Prepared by: Lisette Jones       Manuals 2/3 2/7 2/9 2/14 2/16 2/21 2/23      PROM left shoulder JF JF JF JF JF JF SC      GH mobs Inf, post      JF       Pec minor release      JF                    Neuro Re-Ed             Webslide rows M,L  BTB 2x10 BTB 2x10 BTB 2x15 BTB 2x15 BTB 2x15 BTB 2x 15       Prone I,Y,T   2x10          Prone row  x20 x20                                                              Ther Ex             UBE retro  L3x4' retro L4x4' L4x4' L4x4' L4x8  F/R       T-band shoulder IR/ER  BTB x20 BTB x20 BTB x30 BTB x30 BTB x30 BTB 30x       Wall pulleys  5" x20 5" x20 5" x20 5" x20        Wall slides flexion, scaption Instructed   x20 ea x20 ea x20 ea       Wall sleeper stretch  3x30"  3x30" 3x30" 3x30"       Table slides flexion, scaption and ER Instructed            Cane flexion, abd, IR and ext             Stand post  Capsule stretch  3x30"  3x30" 3x30" 3x30" 3x 30"       Corner pec stretch   3x30" 3x30" 3x30" 3x30" 3x 30"       Ther Activity                                       Gait Training                                       Modalities                                          1 on  1 time for 29 minutes on 22

## 2022-02-28 ENCOUNTER — OFFICE VISIT (OUTPATIENT)
Dept: PHYSICAL THERAPY | Facility: CLINIC | Age: 67
End: 2022-02-28
Payer: MEDICARE

## 2022-02-28 DIAGNOSIS — M25.512 ACUTE PAIN OF LEFT SHOULDER: Primary | ICD-10-CM

## 2022-02-28 LAB — MISCELLANEOUS LAB TEST RESULT: NORMAL

## 2022-02-28 PROCEDURE — 97140 MANUAL THERAPY 1/> REGIONS: CPT | Performed by: PHYSICAL THERAPIST

## 2022-02-28 PROCEDURE — 97110 THERAPEUTIC EXERCISES: CPT | Performed by: PHYSICAL THERAPIST

## 2022-02-28 NOTE — PROGRESS NOTES
Daily Note     Today's date: 2022  Patient name: Chris Orozco  : 1955  MRN: [id number]  Referring provider: Marcy Hagan*  Dx:   Encounter Diagnosis     ICD-10-CM    1  Acute pain of left shoulder  M25 512                   Subjective: Patient states his shoulder has been feeling well  He reports his shoulder ROM is getting much better and his pain levels are considerably lower  Objective: See treatment diary below      Assessment: Tolerated treatment well  Patient would benefit from continued PT   ROM, strength, and functional use of shoulder continue to get better  Still has pain with reaching out to close the car door with his left UE  Plan: Continue per plan of care  Precautions: benign renovascular HTN  Access Code: [facility]  URL: [email or web address]  Date: 2022  Prepared by: Marco Anderson       Manuals 2/3 2/7 2/9 2/14 2/16 2/21 2/23 2/28     PROM left shoulder JF JF JF JF JF JF SC JF     GH mobs Inf, post      JF  JF     Pec minor release      JF  JF                  Neuro Re-Ed             Webslide rows M,L  BTB 2x10 BTB 2x10 BTB 2x15 BTB 2x15 BTB 2x15 BTB 2x 15  BTB 2x 15      Prone I,Y,T   2x10          Prone row  x20 x20                                                              Ther Ex             UBE retro  L3x4' retro L4x4' L4x4' L4x4' L4x8 1/ F/R L4x4' retro L4x4' retro     T-band shoulder IR/ER  BTB x20 BTB x20 BTB x30 BTB x30 BTB x30 BTB 30x  BTB 30x      Wall pulleys  5" x20 5" x20 5" x20 5" x20   5" x20     Wall slides flexion, scaption Instructed   x20 ea x20 ea x20 ea  x20 ea     Wall sleeper stretch  3x30"  3x30" 3x30" 3x30"  3x30"     Table slides flexion, scaption and ER Instructed            Cane flexion, abd, IR and ext             Stand post  Capsule stretch  3x30"  3x30" 3x30" 3x30" 3x 30"  3x 30"      Corner pec stretch   3x30" 3x30" 3x30" 3x30" 3x 30"  3x 30"      Ther Activity                                       Gait Training                                       Modalities

## 2022-03-02 ENCOUNTER — EVALUATION (OUTPATIENT)
Dept: PHYSICAL THERAPY | Facility: CLINIC | Age: 67
End: 2022-03-02
Payer: MEDICARE

## 2022-03-02 DIAGNOSIS — M25.512 ACUTE PAIN OF LEFT SHOULDER: Primary | ICD-10-CM

## 2022-03-02 PROCEDURE — 97110 THERAPEUTIC EXERCISES: CPT | Performed by: PHYSICAL THERAPIST

## 2022-03-02 PROCEDURE — 97140 MANUAL THERAPY 1/> REGIONS: CPT | Performed by: PHYSICAL THERAPIST

## 2022-03-02 NOTE — PROGRESS NOTES
PT Re-Evaluation     Today's date: 3/2/2022  Patient name: Lisa Bahena  : 1955  MRN: [id number]  Referring provider: Donna Gold*  Dx:   Encounter Diagnosis     ICD-10-CM    1  Acute pain of left shoulder  M25 512                   Assessment  Assessment details: Patient reports feeling 70% improved to date  He has noticed a significant decreased in both the frequency and intensity of his pain, as well as, increased functional use of his left UE  He can now reach into overhead cabinets easier, use his arm to drive his car, reach behind his back, and hold his camera to take pictures without difficulty  He can also close his car door easier, but that is still the motion that causes the most pain  Objectively, he demonstrates greatly improved left shoulder ROM and strength  Patient will benefit from continued PT services in order to further improve his shoulder ROM, especially overhead to restore normal functional use of his left UE  Impairments: abnormal or restricted ROM, activity intolerance, pain with function and poor posture   Functional limitations: Pain with reaching behind back, overhead activity, lying on side, and heavier lifting  Understanding of Dx/Px/POC: good   Prognosis: good    Goals  STG (6 weeks)  1  Patient will demonstrate 50% gains in left shoulder ROM - met  2  Patient will report pain as a 3-4/10 with overhead activity - met  LTG (12 weeks)  1  Patient will demonstrate left shoulder ROM WFL to reach behind back and overhead without pain - partially met  2  Patient will report the ability to sleep on his side without pain - partially met  3  Patient will report the ability to carry and use his camera without increased shoulder pain - met  4  Patient will report pain as a 0-1/10 at worst with normal use of his left shoulder - partially met  5  Patient will be independent and compliant with a Hep in order to maintain gains made with skilled PT services   - partially met    Plan  Patient would benefit from: skilled physical therapy  Planned modality interventions: cryotherapy  Planned therapy interventions: joint mobilization, manual therapy, neuromuscular re-education, patient education, postural training, strengthening, stretching, therapeutic activities, therapeutic exercise and home exercise program  Frequency: 2x week  Duration in weeks: 8  Plan of Care beginning date: 2/3/2022  Plan of Care expiration date: 2022  Treatment plan discussed with: patient        Subjective Evaluation    History of Present Illness  Mechanism of injury: Patient reports feeling 70% improved to date  He no longer has pain into forearm and hand  He states he can reach behind his back without difficulty, overhead, and can use his arm to steer his car without difficulty  He states reaching to close the car door has gotten much better, but is still problematic  Pain levels are greatly diminished and he states the frequency of his pain is also greatly decreased  Has no pain with shooting pictures with his camera  Quality of life: good    Pain  Current pain ratin  At best pain ratin  At worst pain ratin  Location: Left superior shoulder  Quality: dull ache  Progression: improved    Social Support  Lives with: spouse    Employment status: not working (Retired)  Hand dominance: right      Diagnostic Tests  No diagnostic tests performed  Treatments  Previous treatment: physical therapy  Patient Goals  Patient goals for therapy: increased motion and decreased pain          Objective     Static Posture     Shoulders  Rounded  Scapulae  Left protracted and right protracted  Postural Observations  Seated posture: fair        Tenderness     Left Shoulder   No tenderness in the [facility] joint, acromion, bicipital groove, clavicle, coracoid process, infraspinatus tendon, subscapularis tendon and supraspinatus tendon       Active Range of Motion   Left Shoulder   Flexion: 136 degrees with pain  Abduction: 130 degrees   External rotation 45°: 48 degrees   Internal rotation 45°: 70 degrees     Right Shoulder   Flexion: 167 degrees   Abduction: 165 degrees   External rotation 90°: 90 degrees    Passive Range of Motion   Left Shoulder   Flexion: 152 degrees with pain  Abduction: 160 degrees   External rotation 45°: 57 degrees with pain  Internal rotation 45°: 80 degrees     Right Shoulder   External rotation 45°: 90 degrees     Strength/Myotome Testing     Left Shoulder     Planes of Motion   Flexion: 4+   Abduction: 4+   External rotation at 0°: 4   Internal rotation at 0°: 4+     Tests     Left Shoulder   Negative AC shear, empty can, painful arc and Speed's  Additional Tests Details  Capsular end-feel all shoulder motions             Precautions: benign renovascular HTN  Access Code: [facility]  URL: [email or web address]  Date: 02/07/2022  Prepared by: Aly Math       Manuals 2/3 2/7 2/9 2/14 2/16 2/21 2/23 2/28 3/2    PROM left shoulder JF JF JF JF JF JF SC JF JF    GH mobs Inf, post      JF  JF JF    Pec minor release      JF  JF                  Neuro Re-Ed             Webslide rows M,L  BTB 2x10 BTB 2x10 BTB 2x15 BTB 2x15 BTB 2x15 BTB 2x 15  BTB 2x 15      Prone I,Y,T   2x10          Prone row  x20 x20                                                              Ther Ex             UBE retro  L3x4' retro L4x4' L4x4' L4x4' L4x8 1/2 F/R L4x4' retro L4x4' retro L4x4' retro    T-band shoulder IR/ER  BTB x20 BTB x20 BTB x30 BTB x30 BTB x30 BTB 30x  BTB 30x      Wall pulleys  5" x20 5" x20 5" x20 5" x20   5" x20     Wall slides flexion, scaption Instructed   x20 ea x20 ea x20 ea  x20 ea x20 ea    Wall sleeper stretch  3x30"  3x30" 3x30" 3x30"  3x30" 3x30" VC's for technique    Table slides flexion, scaption and ER Instructed            Cane flexion, abd, IR and ext             Stand post  Capsule stretch  3x30"  3x30" 3x30" 3x30" 3x 30"  3x 30"  3x 30"     Corner pec stretch   3x30" 3x30" 3x30" 3x30" 3x 30"  3x 30"  3x 30"     Ther Activity                                       Gait Training                                       Modalities

## 2022-03-07 ENCOUNTER — OFFICE VISIT (OUTPATIENT)
Dept: PHYSICAL THERAPY | Facility: CLINIC | Age: 67
End: 2022-03-07
Payer: MEDICARE

## 2022-03-07 DIAGNOSIS — M25.512 ACUTE PAIN OF LEFT SHOULDER: Primary | ICD-10-CM

## 2022-03-07 PROCEDURE — 97140 MANUAL THERAPY 1/> REGIONS: CPT

## 2022-03-07 PROCEDURE — 97110 THERAPEUTIC EXERCISES: CPT

## 2022-03-07 NOTE — PROGRESS NOTES
Daily Note     Today's date: 3/7/2022  Patient name: Arielle Gibbons  : 1955  MRN: [id number]  Referring provider: Harleen Dennis*  Dx:   Encounter Diagnosis     ICD-10-CM    1  Acute pain of left shoulder  M25 512                   Subjective: Pt noted L shoulder feeling pretty good noted he has bene inconsistently with  his HEP for this week  Objective: See treatment diary below      Assessment: Tolerated treatment fair  Patient exhibited good technique with therapeutic exercises and would benefit from continued PT      Plan: Continue per plan of care  Precautions: benign renovascular HTN  Access Code: [facility]AC+[facility]  URL: [email or web address]  Date: 2022  Prepared by: Mallorie Allen       Manuals 2/3 2/7 2/9 2/14 2/16 2/21 2/23 2/28 3/2 3/7   PROM left shoulder JF JF JF JF JF JF SC JF JF SC   GH mobs Inf, post      JF  JF JF    Pec minor release      JF  JF                  Neuro Re-Ed             Webslide rows M,L  BTB 2x10 BTB 2x10 BTB 2x15 BTB 2x15 BTB 2x15 BTB 2x 15  BTB 2x 15   Black 2x 15    Prone I,Y,T   2x10          Prone row  x20 x20                                                              Ther Ex             UBE retro  L3x4' retro L4x4' L4x4' L4x4' L4x8 / F/R L4x4' retro L4x4' retro L4x4' retro L4 4' retro    T-band shoulder IR/ER  BTB x20 BTB x20 BTB x30 BTB x30 BTB x30 BTB 30x  BTB 30x   Black 30x    Wall pulleys  5" x20 5" x20 5" x20 5" x20   5" x20     Wall slides flexion, scaption Instructed   x20 ea x20 ea x20 ea  x20 ea x20 ea 20x ea      Wall sleeper stretch  3x30"  3x30" 3x30" 3x30"  3x30" 3x30" VC's for technique 3x 30" VC's    Table slides flexion, scaption and ER Instructed            Cane flexion, abd, IR and ext             Stand post  Capsule stretch  3x30"  3x30" 3x30" 3x30" 3x 30"  3x 30"  3x 30"  3x 30"    Corner pec stretch   3x30" 3x30" 3x30" 3x30" 3x 30"  3x 30"  3x 30"  3x 30"    Ther Activity                                       Gait Training                                       Modalities family member

## 2022-03-09 ENCOUNTER — OFFICE VISIT (OUTPATIENT)
Dept: PHYSICAL THERAPY | Facility: CLINIC | Age: 67
End: 2022-03-09
Payer: MEDICARE

## 2022-03-09 DIAGNOSIS — M25.512 ACUTE PAIN OF LEFT SHOULDER: Primary | ICD-10-CM

## 2022-03-09 PROCEDURE — 97140 MANUAL THERAPY 1/> REGIONS: CPT

## 2022-03-09 PROCEDURE — 97112 NEUROMUSCULAR REEDUCATION: CPT

## 2022-03-09 PROCEDURE — 97110 THERAPEUTIC EXERCISES: CPT

## 2022-03-09 NOTE — PROGRESS NOTES
Daily Note     Today's date: 3/9/2022  Patient name: Moises Olivia  : 1955  MRN: [id number]  Referring provider: Blanche Hernandez*  Dx:   Encounter Diagnosis     ICD-10-CM    1  Acute pain of left shoulder  M25 512        Start Time: 814          Subjective: Pt noted feeling okay and a little warmed up already this morning prior to treatment session  Objective: See treatment diary below      Assessment:  Continued with treatment session, VC for decreased compensatory mechanics while performing stretches of AAROM of L shoulder  Educated importance of performing exercises with proper techniques along with the benefits of each  Moderate challenge with Prone T's today  Minimal range due to lack of ROM, no p! Noted  Tolerated treatment fair  Patient demonstrated fatigue post treatment, exhibited good technique with therapeutic exercises and would benefit from continued PT  S/p treatment session, pt noted having some soreness no other complaints at this time  At the end of session, the patient received an updated HEP along with instructions of every day performance, unless noted otherwise and/or having increased discomfort or pain  DOMS reviewed and acknowledged by patient may have 24 to 48 hours of muscle soreness following treatment session        Plan: Continue per plan of care  Precautions: benign renovascular HTN  Access Code: [facility]  URL: [email or web address]  Date: 2022  Prepared by: Sam Ibarra       Manuals 3/9  2/9 2/14 2/16 2/21 2/23 2/28 3/2 3/7   PROM left shoulder SC  JF JF JF JF SC JF JF SC   GH mobs Inf, post      JF  JF JF    Pec minor release      TAYLOR  JF                  Neuro Re-Ed             Webslide rows M,L NV  BTB 2x10 BTB 2x15 BTB 2x15 BTB 2x15 BTB 2x 15  BTB 2x 15   Black 2x 15    Prone I,Y,T 2x 10 I and Y, 10x T   2x10          Prone row NV  x20                                                              Ther Ex             UBE retro L4 3'/'3  L4x4' L4x4' L4x4' L4x8 1/2 F/R L4x4' retro L4x4' retro L4x4' retro L4 4' retro    T-band shoulder IR/ER NV  BTB x20 BTB x30 BTB x30 BTB x30 BTB 30x  BTB 30x   Black 30x    Wall pulleys 5" 20x FF and 3x abd pt request      5" x20 5" x20 5" x20   5" x20     Wall slides flexion, scaption 20x 5" ea  x20 ea x20 ea x20 ea  x20 ea x20 ea 20x ea  Wall sleeper stretch 3x 30" VC's   3x30" 3x30" 3x30"  3x30" 3x30" VC's for technique 3x 30" VC's    Cane flexion, abd, IR and ext             Stand post  Capsule stretch 3x 30"    3x30" 3x30" 3x30" 3x 30"  3x 30"  3x 30"  3x 30"    Corner pec stretch 3x 30" VC's   3x30" 3x30" 3x30" 3x30" 3x 30"  3x 30"  3x 30"  3x 30"    Ther Activity                                       Gait Training                                       Modalities                                           Access Code: [email or web address]  URL: [email or web address]  Date: 03/09/2022  Prepared by: Ranjan Navarro    Exercises  · Seated Shoulder Scaption Slide at Table Top with Forearm in Neutral - 1 x daily - 7 x weekly - 2-3 sets - 10 reps - 5 sec hold  · Seated Shoulder Flexion Towel Slide at Table Top - 1 x daily - 7 x weekly - 2-3 sets - 10 reps - 5 sec hold  · Seated Shoulder External Rotation PROM on Table - 1 x daily - 7 x weekly - 2-3 sets - 10 reps - 5 sec hold  · Standing Single Shoulder Flexion Wall Slide with Palm Up - 1 x daily - 7 x weekly - 2-3 sets - 10 reps - 5 sec hold  · Standing Shoulder Abduction Slides at Wall - 1 x daily - 7 x weekly - 2-3 sets - 10 reps - 5 sec hold  · Shoulder Extension with Resistance - 1 x daily - 7 x weekly - 2 sets - 15 reps  · Standing Shoulder Row with Anchored Resistance - 1 x daily - 7 x weekly - 2 sets - 15 reps  · Shoulder Internal Rotation with Resistance - 1 x daily - 7 x weekly - 2 sets - 15 reps  · Shoulder External Rotation with Anchored Resistance - 1 x daily - 7 x weekly - 2 sets - 15 reps  · Standing Shoulder Posterior Capsule Stretch - 1 x daily - 7 x weekly - 3 reps - 30 hold  · Corner Pec Major Stretch - 1 x daily - 7 x weekly - 3 reps - 30 hold  · Prone Shoulder Extension - Single Arm - 1 x daily - 7 x weekly - 2 sets - 10 reps  · Prone Shoulder Row - 1 x daily - 7 x weekly - 2 sets - 10 reps  · Prone Single Arm Shoulder Horizontal Abduction with Scapular Retraction and Palm Down - 1 x daily - 7 x weekly - 2 sets - 10 reps  · Prone Shoulder Flexion - 1 x daily - 7 x weekly - 2 sets - 10 reps

## 2022-03-10 ENCOUNTER — PREP FOR PROCEDURE (OUTPATIENT)
Dept: GASTROENTEROLOGY | Facility: CLINIC | Age: 67
End: 2022-03-10

## 2022-03-10 DIAGNOSIS — Z86.010 HISTORY OF COLON POLYPS: Primary | ICD-10-CM

## 2022-03-14 ENCOUNTER — OFFICE VISIT (OUTPATIENT)
Dept: PHYSICAL THERAPY | Facility: CLINIC | Age: 67
End: 2022-03-14
Payer: MEDICARE

## 2022-03-14 DIAGNOSIS — M25.512 ACUTE PAIN OF LEFT SHOULDER: Primary | ICD-10-CM

## 2022-03-14 PROCEDURE — 97110 THERAPEUTIC EXERCISES: CPT | Performed by: PHYSICAL THERAPIST

## 2022-03-14 PROCEDURE — 97112 NEUROMUSCULAR REEDUCATION: CPT | Performed by: PHYSICAL THERAPIST

## 2022-03-14 PROCEDURE — 97140 MANUAL THERAPY 1/> REGIONS: CPT | Performed by: PHYSICAL THERAPIST

## 2022-03-14 NOTE — PROGRESS NOTES
Daily Note     Today's date: 3/14/2022  Patient name: Jossie Richter  : 1955  MRN: [id number]  Referring provider: Jack Ferris  Dx:   Encounter Diagnosis     ICD-10-CM    1  Acute pain of left shoulder  M25 512                   Subjective: Patient reports he continues to feel better  Minimal discomfort in his shoulder with normal activity  Objective: See treatment diary below      Assessment: Tolerated treatment well  Patient would benefit from continued PT  Patient demonstrates improved AROM and PROM  Shoulder ER ROM continues to be limited  Again advised patient to perform stretch for a longer period of time to allow for improved tissue flexibility  Plan: Continue per plan of care  Precautions: benign renovascular HTN  Access Code: [facility]  URL: [email or web address]  Date: 2022  Prepared by: Aidan Banks       Manuals 3/9 3/14 2/9 2/14 2/16 2/21 2/23 2/28 3/2 3/7   PROM left shoulder SC JF JF JF JF JF SC JF JF SC   GH mobs Inf, post  JF    JF  JF JF    Pec minor release      JF  JF                  Neuro Re-Ed             Webslide rows M,L NV Black 2x 15  BTB 2x10 BTB 2x15 BTB 2x15 BTB 2x15 BTB 2x 15  BTB 2x 15   Black 2x 15    Prone I,Y,T 2x 10 I and Y, 10x T  2x10 ea 2x10          Prone row NV  x20                                                              Ther Ex             UBE retro L4 3'/'3 L4 3'/'3 L4x4' L4x4' L4x4' L4x8 1/ F/R L4x4' retro L4x4' retro L4x4' retro L4 4' retro    T-band shoulder IR/ER NV  BTB x20 BTB x30 BTB x30 BTB x30 BTB 30x  BTB 30x   Black 30x    Wall pulleys 5" 20x FF and 3x abd pt request     5" 20x FF and 3x abd pt request   5" x20 5" x20 5" x20   5" x20     Wall slides flexion, scaption 20x 5" ea  20x 5" ea  x20 ea x20 ea x20 ea  x20 ea x20 ea 20x ea      Wall sleeper stretch 3x 30" VC's 3x 30" VC's  3x30" 3x30" 3x30"  3x30" 3x30" VC's for technique 3x 30" VC's    Cane flexion, abd, IR and ext             Stand post  Capsule stretch 3x 30"  3x30"  3x30" 3x30" 3x30" 3x 30"  3x 30"  3x 30"  3x 30"    Corner pec stretch 3x 30" VC's  3x 30" VC's +- 3x30" 3x30" 3x30" 3x30" 3x 30"  3x 30"  3x 30"  3x 30"    Ther Activity                                       Gait Training                                       Modalities

## 2022-03-16 ENCOUNTER — OFFICE VISIT (OUTPATIENT)
Dept: PHYSICAL THERAPY | Facility: CLINIC | Age: 67
End: 2022-03-16
Payer: MEDICARE

## 2022-03-16 DIAGNOSIS — M25.512 ACUTE PAIN OF LEFT SHOULDER: Primary | ICD-10-CM

## 2022-03-16 PROCEDURE — 97110 THERAPEUTIC EXERCISES: CPT

## 2022-03-16 PROCEDURE — 97140 MANUAL THERAPY 1/> REGIONS: CPT

## 2022-03-16 PROCEDURE — 97112 NEUROMUSCULAR REEDUCATION: CPT

## 2022-03-16 NOTE — PROGRESS NOTES
Daily Note     Today's date: 3/16/2022  Patient name: Aleksandar Matthews  : 1955  MRN: [id number]  Referring provider: Micha Sher*  Dx:   Encounter Diagnosis     ICD-10-CM    1  Acute pain of left shoulder  M25 512        Start Time: 812  Stop Time: 09  Total time in clinic (min): 48 minutes    Subjective: Pt noted that he is getting better and having less pain  Objective: See treatment diary below      Assessment:  Continued with treatment session, Tolerated treatment fair  Patient exhibited good technique with therapeutic exercises and would benefit from continued PT  VC required to facilitate proper form with stretches  Pt demonstrated proper scapular setting with TB exercises  Noted some increase LBP with wall sides today therefore added a rest break after 10 reps  Added ER 90/90 stretch on wall to decrease tightness and overall facilitate increased shoulder ER  S/p treatment session, Pt noted no significant changes, minimal soreness noted  HEP education and encouraged to continue with all stretches at home to improve ROM in L shoulder  Pt education on counts of exercises as well as printed on HEP  Plan: Continue per plan of care  Progress as patient is able  Precautions: benign renovascular HTN  Access Code: [facility]  URL: [email or web address]  Date: 2022  Prepared by: Jasen Joyce       Manuals 3/9 3/14 3/16   2/21 2/23 2/28 3/2 3/7   PROM left shoulder SC JF SC   JF SC JF JF SC   GH mobs Inf, post  JF    JF  JF JF    Pec minor release      JF  JF                  Neuro Re-Ed             Webslide rows M,L NV Black 2x 15  Black 2x 15    BTB 2x15 BTB 2x 15  BTB 2x 15   Black 2x 15    Prone I,Y,T 2x 10 I and Y, 10x T  2x10 ea NV          Prone row NV  NV                                                              Ther Ex             UBE retro L4 3'/'3 L4 3'/'3 L4 4'/4'   L4x8 1/2 F/R L4x4' retro L4x4' retro L4x4' retro L4 4' retro    T-band shoulder IR/ER NV  Black 30x    BTB x30 BTB 30x  BTB 30x   Black 30x    Wall pulleys 5" 20x FF and 3x abd pt request     5" 20x FF and 3x abd pt request   NV     5" x20     Wall slides flexion, scaption 20x 5" ea  20x 5" ea  5" 2x 10   refused a towel  x20 ea  x20 ea x20 ea 20x ea  Wall sleeper stretch 3x 30" VC's 3x 30" VC's 3x 30" VC    3x30"  3x30" 3x30" VC's for technique 3x 30" VC's    Cane flexion, abd, IR and ext             Stand post  Capsule stretch 3x 30"  3x30" 4x 30"    3x30" 3x 30"  3x 30"  3x 30"  3x 30"    Corner pec stretch 3x 30" VC's  3x 30" VC's +- 3x 30"   3x30" 3x 30"  3x 30"  3x 30"  3x 30"    ER wall stretch  90/90   3x 30" VC's                                                              Ther Activity                                       Gait Training                                       Modalities                                           Access Code: [email or web address]  URL: [email or web address]  Date: 03/09/2022  Prepared by: Paralee Musty    Exercises  · Seated Shoulder Scaption Slide at Table Top with Forearm in Neutral - 1 x daily - 7 x weekly - 2-3 sets - 10 reps - 5 sec hold  · Seated Shoulder Flexion Towel Slide at Table Top - 1 x daily - 7 x weekly - 2-3 sets - 10 reps - 5 sec hold  · Seated Shoulder External Rotation PROM on Table - 1 x daily - 7 x weekly - 2-3 sets - 10 reps - 5 sec hold  · Standing Single Shoulder Flexion Wall Slide with Palm Up - 1 x daily - 7 x weekly - 2-3 sets - 10 reps - 5 sec hold  · Standing Shoulder Abduction Slides at Wall - 1 x daily - 7 x weekly - 2-3 sets - 10 reps - 5 sec hold  · Shoulder Extension with Resistance - 1 x daily - 7 x weekly - 2 sets - 15 reps  · Standing Shoulder Row with Anchored Resistance - 1 x daily - 7 x weekly - 2 sets - 15 reps  · Shoulder Internal Rotation with Resistance - 1 x daily - 7 x weekly - 2 sets - 15 reps  · Shoulder External Rotation with Anchored Resistance - 1 x daily - 7 x weekly - 2 sets - 15 reps  · Standing Shoulder Posterior Capsule Stretch - 1 x daily - 7 x weekly - 3 reps - 30 hold  · Corner Pec Major Stretch - 1 x daily - 7 x weekly - 3 reps - 30 hold  · Prone Shoulder Extension - Single Arm - 1 x daily - 7 x weekly - 2 sets - 10 reps  · Prone Shoulder Row - 1 x daily - 7 x weekly - 2 sets - 10 reps  · Prone Single Arm Shoulder Horizontal Abduction with Scapular Retraction and Palm Down - 1 x daily - 7 x weekly - 2 sets - 10 reps  · Prone Shoulder Flexion - 1 x daily - 7 x weekly - 2 sets - 10 reps

## 2022-03-21 ENCOUNTER — OFFICE VISIT (OUTPATIENT)
Dept: PHYSICAL THERAPY | Facility: CLINIC | Age: 67
End: 2022-03-21
Payer: MEDICARE

## 2022-03-21 DIAGNOSIS — M25.512 ACUTE PAIN OF LEFT SHOULDER: Primary | ICD-10-CM

## 2022-03-21 PROCEDURE — 97112 NEUROMUSCULAR REEDUCATION: CPT | Performed by: PHYSICAL THERAPIST

## 2022-03-21 PROCEDURE — 97110 THERAPEUTIC EXERCISES: CPT | Performed by: PHYSICAL THERAPIST

## 2022-03-21 PROCEDURE — 97140 MANUAL THERAPY 1/> REGIONS: CPT | Performed by: PHYSICAL THERAPIST

## 2022-03-21 NOTE — PROGRESS NOTES
Daily Note     Today's date: 3/21/2022  Patient name: Trisha Landry  : 1955  MRN: [id number]  Referring provider: Kamala Cyr*  Dx:   Encounter Diagnosis     ICD-10-CM    1  Acute pain of left shoulder  M25 512                   Subjective: Patient reports his shoulder is feeling very good  Has minimal discomfort only once a day  Objective: See treatment diary below      Assessment: Tolerated treatment well  Patient would benefit from continued PT    ROM continues to improve  No pain  VC's for correct technique with TE  Plan: Continue per plan of care  Precautions: benign renovascular HTN  Access Code: [facility]  URL: [email or web address]  Date: 2022  Prepared by: Cas Patterson       Manuals 3/9 3/14 3/16 3/21  2/21 2/23 2/28 3/2 3/7   PROM left shoulder SC JF SC JF  JF SC JF JF SC   GH mobs Inf, post  JF    JF  JF JF    Pec minor release      JF  JF                  Neuro Re-Ed             Webslide rows M,L NV Black 2x 15  Black 2x 15  Black 2x 15   BTB 2x15 BTB 2x 15  BTB 2x 15   Black 2x 15    Prone I,Y,T 2x 10 I and Y, 10x T  2x10 ea NV 2x10 ea         Prone row NV  NV                                                              Ther Ex             UBE retro L4 3'/'3 L4 3'/'3 L4 4'/4' L5 3'/3'  L4x8 1/2 F/R L4x4' retro L4x4' retro L4x4' retro L4 4' retro    T-band shoulder IR/ER NV  Black 30x  Black 30x   BTB x30 BTB 30x  BTB 30x   Black 30x    Wall pulleys 5" 20x FF and 3x abd pt request     5" 20x FF and 3x abd pt request   NV     5" x20     Wall slides flexion, scaption 20x 5" ea  20x 5" ea  5" 2x 10   refused a towel  5" 2x 10    x20 ea  x20 ea x20 ea 20x ea      Wall sleeper stretch 3x 30" VC's 3x 30" VC's 3x 30" VC  3x 30" VC   3x30"  3x30" 3x30" VC's for technique 3x 30" VC's    Cane flexion, abd, IR and ext             Stand post  Capsule stretch 3x 30"  3x30" 4x 30"  4x 30"   3x30" 3x 30"  3x 30"  3x 30"  3x 30"    Corner pec stretch 3x 30" VC's  3x 30" VC's +- 3x 30" 3x 30"  3x30" 3x 30"  3x 30"  3x 30"  3x 30"    ER wall stretch  90/90   3x 30" VC's                                                              Ther Activity                                       Gait Training                                       Modalities

## 2022-03-23 ENCOUNTER — OFFICE VISIT (OUTPATIENT)
Dept: PHYSICAL THERAPY | Facility: CLINIC | Age: 67
End: 2022-03-23
Payer: MEDICARE

## 2022-03-23 DIAGNOSIS — M25.512 ACUTE PAIN OF LEFT SHOULDER: Primary | ICD-10-CM

## 2022-03-23 PROCEDURE — 97110 THERAPEUTIC EXERCISES: CPT | Performed by: PHYSICAL THERAPIST

## 2022-03-23 NOTE — PROGRESS NOTES
Daily Note     Today's date: 3/23/2022  Patient name: Cynthia Thomson  : 1955  MRN: [id number]  Referring provider: Arya Lawrence*  Dx:   Encounter Diagnosis     ICD-10-CM    1  Acute pain of left shoulder  M25 512                   Subjective: Shoulder is feeling very good  No pain        Objective: See treatment diary below      Assessment: Tolerated treatment well  Patient would benefit from continued PT  Patient states he did not feel any stretch with standing sleeper stretch  Instructed in performance with sidelying sleeper stretch  Patient reported pain  Advised him to stretch less aggressively  Reviewed technique with ER wall stretch  Plan: Continue per plan of care  Precautions: benign renovascular HTN  Access Code: [facility]  URL: [email or web address]  Date: 2022  Prepared by: Berry Jimenez       Manuals 3/9 3/14 3/16 3/21 3/23 2/21 2/23 2/28 3/2 3/7   PROM left shoulder SC JF SC JF JF JF SC JF JF SC   GH mobs Inf, post  JF    JF  JF JF    Pec minor release      JF  JF                  Neuro Re-Ed             Webslide rows M,L NV Black 2x 15  Black 2x 15  Black 2x 15  Black 2x 15  BTB 2x15 BTB 2x 15  BTB 2x 15   Black 2x 15    Prone I,Y,T 2x 10 I and Y, 10x T  2x10 ea NV 2x10 ea  2x10        Prone row NV  NV                                                              Ther Ex             UBE retro L4 3'/'3 L4 3'/'3 L4 4'/4' L5 3'/3' L4 4'/4' L4x8 1/2 F/R L4x4' retro L4x4' retro L4x4' retro L4 4' retro    T-band shoulder IR/ER NV  Black 30x  Black 30x  Black 30x  BTB x30 BTB 30x  BTB 30x   Black 30x    Wall pulleys 5" 20x FF and 3x abd pt request     5" 20x FF and 3x abd pt request   NV     5" x20     Wall slides flexion, scaption 20x 5" ea  20x 5" ea  5" 2x 10   refused a towel  5" 2x 10   5" 2x 10   x20 ea  x20 ea x20 ea 20x ea      Wall sleeper stretch 3x 30" VC's 3x 30" VC's 3x 30" VC  3x 30" VC  Sidelying 3x 30" VC  3x30"  3x30" 3x30" VC's for technique 3x 30" VC's    Cane flexion, abd, IR and ext             Stand post  Capsule stretch 3x 30"  3x30" 4x 30"  4x 30"  4x 30"  3x30" 3x 30"  3x 30"  3x 30"  3x 30"    Corner pec stretch 3x 30" VC's  3x 30" VC's +- 3x 30" 3x 30" 3x 30" 3x30" 3x 30"  3x 30"  3x 30"  3x 30"    ER wall stretch  90/90   3x 30" VC's  3x 30" VC's                                                            Ther Activity                                       Gait Training                                       Modalities

## 2022-03-28 ENCOUNTER — OFFICE VISIT (OUTPATIENT)
Dept: PHYSICAL THERAPY | Facility: CLINIC | Age: 67
End: 2022-03-28
Payer: MEDICARE

## 2022-03-28 DIAGNOSIS — M25.512 ACUTE PAIN OF LEFT SHOULDER: Primary | ICD-10-CM

## 2022-03-28 PROCEDURE — 97140 MANUAL THERAPY 1/> REGIONS: CPT | Performed by: PHYSICAL THERAPIST

## 2022-03-28 PROCEDURE — 97110 THERAPEUTIC EXERCISES: CPT | Performed by: PHYSICAL THERAPIST

## 2022-03-28 PROCEDURE — 97112 NEUROMUSCULAR REEDUCATION: CPT | Performed by: PHYSICAL THERAPIST

## 2022-03-28 NOTE — PROGRESS NOTES
Daily Note     Today's date: 3/28/2022  Patient name: Praveen Nguyen  : 1955  MRN: [id number]  Referring provider: Joanna Lorenzo*  Dx:   Encounter Diagnosis     ICD-10-CM    1  Acute pain of left shoulder  M25 512                   Subjective: Had only only mild discomfort twice in the past 5 days  Denies functional limitations  Had ache in shoulder with carrying camera and heavy lens in left shoulder  Objective: See treatment diary below      Assessment: Tolerated treatment well  Patient would benefit from continued PT  Requires VC's for correct performance of TE  Advised to perform sleeper stretch until stretch is felt and not to push until point of pain  Plan: Continue per plan of care  Precautions: benign renovascular HTN  Access Code: [facility]  URL: [email or web address]  Date: 2022  Prepared by: Abel Molina       Manuals 3/9 3/14 3/16 3/21 3/23 3/28 2/23 2/28 3/2 3/7   PROM left shoulder SC JF SC JF JF JF SC JF JF SC   GH mobs Inf, post  JF    JF  JF JF    Pec minor release        JF                  Neuro Re-Ed             Webslide rows M,L NV Black 2x 15  Black 2x 15  Black 2x 15  Black 2x 15  Black 2x 15  BTB 2x 15  BTB 2x 15   Black 2x 15    Prone I,Y,T 2x 10 I and Y, 10x T  2x10 ea NV 2x10 ea  2x10 2x10       Prone row NV  NV                                                              Ther Ex             UBE retro L4 3'/'3 L4 3'/'3 L4 4'/4' L5 3'/3' L4 4'/4' L4x8 1/ F/R L4x4' retro L4x4' retro L4x4' retro L4 4' retro    T-band shoulder IR/ER NV  Black 30x  Black 30x  Black 30x  Black 30x  BTB 30x  BTB 30x   Black 30x    Wall pulleys 5" 20x FF and 3x abd pt request     5" 20x FF and 3x abd pt request   NV     5" x20     Wall slides flexion, scaption 20x 5" ea  20x 5" ea  5" 2x 10   refused a towel  5" 2x 10   5" 2x 10   x20 ea  x20 ea x20 ea 20x ea      Wall sleeper stretch 3x 30" VC's 3x 30" VC's 3x 30" VC  3x 30" VC  Sidelying 3x 30" VC  Sidelying 3x 30" VC   3x30" 3x30" VC's for technique 3x 30" VC's    Cane flexion, abd, IR and ext             Stand post  Capsule stretch 3x 30"  3x30" 4x 30"  4x 30"  4x 30"  3x30" 3x 30"  3x 30"  3x 30"  3x 30"    Corner pec stretch 3x 30" VC's  3x 30" VC's +- 3x 30" 3x 30" 3x 30" 3x30" 3x 30"  3x 30"  3x 30"  3x 30"    ER wall stretch  90/90   3x 30" VC's  3x 30" VC's 3x 30" VC's                                                           Ther Activity                                       Gait Training                                       Modalities

## 2022-03-29 NOTE — PROGRESS NOTES
PT Re-Evaluation     Today's date: 3/30/2022  Patient name: William Hamm  : 1955  MRN: [id number]  Referring provider: Maury Louie*  Dx:   Encounter Diagnosis     ICD-10-CM    1  Acute pain of left shoulder  M25 512                   Assessment  Assessment details: Patient reports feeling 95% improved to date  He no longer has pain in his shoulder with functional activity  Pain at worst is a 1/10 when lying on his shoulder  He denies functional limitations and is able to shoot pictures without difficulty  Shoulder ROM is greatly improved and is [facility]  He demonstrates excellent shoulder strength and he is independent and compliant with a HEP  Patient requires no further skilled PT services at this time  Impairments: abnormal or restricted ROM, activity intolerance, pain with function and poor posture   Functional limitations: Pain with reaching behind back, overhead activity, lying on side, and heavier lifting  Understanding of Dx/Px/POC: good   Prognosis: good    Goals  STG (6 weeks)  1  Patient will demonstrate 50% gains in left shoulder ROM - met  2  Patient will report pain as a 3-4/10 with overhead activity - met  LTG (12 weeks)  1  Patient will demonstrate left shoulder ROM WFL to reach behind back and overhead without pain - met  2  Patient will report the ability to sleep on his side without pain - met  3  Patient will report the ability to carry and use his camera without increased shoulder pain - met  4  Patient will report pain as a 0-1/10 at worst with normal use of his left shoulder - met  5  Patient will be independent and compliant with a Hep in order to maintain gains made with skilled PT services   - partially met    Plan  Planned therapy interventions: patient education and home exercise program  Plan of Care beginning date: 2/3/2022  Plan of Care expiration date: 2022  Treatment plan discussed with: patient        Subjective Evaluation    History of Present Illness  Mechanism of injury: Patient reports feeling 95% improved to date  Very infrequently gets a slight discomfort in shoulder at very end-range  He has noticed significant gains in his shoulder ROM and strength  He denies functional limitations and has resumed all of his normal activities at this time  Performs HEP regularly  Quality of life: good    Pain  Current pain ratin  At best pain ratin  At worst pain ratin  Location: Left superior shoulder  Quality: dull ache  Progression: improved    Social Support  Lives with: spouse    Employment status: not working (Retired)  Hand dominance: right      Diagnostic Tests  No diagnostic tests performed    FCE comments: Denies sleep disturbances  Occasionally has some discomfort with lying on his left side  Treatments  Previous treatment: physical therapy  Patient Goals  Patient goals for therapy: increased motion and decreased pain          Objective     Static Posture     Shoulders  Rounded  Scapulae  Left protracted and right protracted  Postural Observations  Seated posture: fair        Tenderness     Left Shoulder   No tenderness in the [facility] joint, acromion, bicipital groove, clavicle, coracoid process, infraspinatus tendon, subscapularis tendon and supraspinatus tendon       Active Range of Motion   Left Shoulder   Flexion: 152 degrees   Abduction: 140 degrees   External rotation 45°: 60 degrees   Internal rotation 45°: 70 degrees     Right Shoulder   Flexion: 167 degrees   Abduction: 165 degrees   External rotation 90°: 90 degrees    Passive Range of Motion   Left Shoulder   Flexion: 160 degrees   Abduction: 160 degrees   External rotation 45°: 70 degrees   Internal rotation 45°: 80 degrees     Right Shoulder   External rotation 45°: 90 degrees     Strength/Myotome Testing     Left Shoulder     Planes of Motion   Flexion: 5   Abduction: 5   External rotation at 0°: 4+   Internal rotation at 0°: 5     Tests     Left Shoulder   Negative AC shear, empty can, painful arc and Speed's  Additional Tests Details  Capsular end-feel all shoulder motions             Precautions: benign renovascular HTN  Access Code: [facility]  URL: [email or web address]  Date: 02/07/2022  Prepared by: Gissel Alexander       Manuals 3/9 3/14 3/16 3/21 3/23 3/28 2/23 2/28 3/2 3/7   PROM left shoulder SC JF SC JF JF JF SC JF JF SC   GH mobs Inf, post  JF    JF  JF JF    Pec minor release        JF                  Neuro Re-Ed             Webslide rows M,L NV Black 2x 15  Black 2x 15  Black 2x 15  Black 2x 15  Black 2x 15  BTB 2x 15  BTB 2x 15   Black 2x 15    Prone I,Y,T 2x 10 I and Y, 10x T  2x10 ea NV 2x10 ea  2x10 2x10       Prone row NV  NV                                                              Ther Ex             UBE retro L4 3'/'3 L4 3'/'3 L4 4'/4' L5 3'/3' L4 4'/4' L4x8 1/2 F/R L4x4' retro L4x4' retro L4x4' retro L4 4' retro    T-band shoulder IR/ER NV  Black 30x  Black 30x  Black 30x  Black 30x  BTB 30x  BTB 30x   Black 30x    Wall pulleys 5" 20x FF and 3x abd pt request     5" 20x FF and 3x abd pt request   NV     5" x20     Wall slides flexion, scaption 20x 5" ea  20x 5" ea  5" 2x 10   refused a towel  5" 2x 10   5" 2x 10   x20 ea  x20 ea x20 ea 20x ea      Wall sleeper stretch 3x 30" VC's 3x 30" VC's 3x 30" VC  3x 30" VC  Sidelying 3x 30" VC  Sidelying 3x 30" VC   3x30" 3x30" VC's for technique 3x 30" VC's    Cane flexion, abd, IR and ext             Stand post  Capsule stretch 3x 30"  3x30" 4x 30"  4x 30"  4x 30"  3x30" 3x 30"  3x 30"  3x 30"  3x 30"    Corner pec stretch 3x 30" VC's  3x 30" VC's +- 3x 30" 3x 30" 3x 30" 3x30" 3x 30"  3x 30"  3x 30"  3x 30"    ER wall stretch  90/90   3x 30" VC's  3x 30" VC's 3x 30" VC's                                                           Ther Activity                                       Gait Training                                       Modalities

## 2022-03-30 ENCOUNTER — EVALUATION (OUTPATIENT)
Dept: PHYSICAL THERAPY | Facility: CLINIC | Age: 67
End: 2022-03-30
Payer: MEDICARE

## 2022-03-30 DIAGNOSIS — M25.512 ACUTE PAIN OF LEFT SHOULDER: Primary | ICD-10-CM

## 2022-03-30 PROCEDURE — 97110 THERAPEUTIC EXERCISES: CPT | Performed by: PHYSICAL THERAPIST

## 2022-03-30 PROCEDURE — 97140 MANUAL THERAPY 1/> REGIONS: CPT | Performed by: PHYSICAL THERAPIST

## 2022-05-03 DIAGNOSIS — Z12.5 ENCOUNTER FOR PROSTATE CANCER SCREENING: ICD-10-CM

## 2022-05-03 RX ORDER — TAMSULOSIN HYDROCHLORIDE 0.4 MG/1
CAPSULE ORAL
Qty: 90 CAPSULE | Refills: 0 | Status: SHIPPED | OUTPATIENT
Start: 2022-05-03 | End: 2022-08-01 | Stop reason: SDUPTHER

## 2022-07-11 ENCOUNTER — ANESTHESIA EVENT (OUTPATIENT)
Dept: GASTROENTEROLOGY | Facility: HOSPITAL | Age: 67
End: 2022-07-11

## 2022-07-11 ENCOUNTER — HOSPITAL ENCOUNTER (OUTPATIENT)
Dept: GASTROENTEROLOGY | Facility: HOSPITAL | Age: 67
Setting detail: OUTPATIENT SURGERY
Discharge: HOME | End: 2022-07-11
Attending: INTERNAL MEDICINE | Admitting: INTERNAL MEDICINE
Payer: MEDICARE

## 2022-07-11 ENCOUNTER — ANESTHESIA (OUTPATIENT)
Dept: GASTROENTEROLOGY | Facility: HOSPITAL | Age: 67
End: 2022-07-11

## 2022-07-11 VITALS
HEART RATE: 68 BPM | HEIGHT: 72 IN | WEIGHT: 223 LBS | BODY MASS INDEX: 30.2 KG/M2 | RESPIRATION RATE: 20 BRPM | DIASTOLIC BLOOD PRESSURE: 74 MMHG | OXYGEN SATURATION: 97 % | TEMPERATURE: 97.9 F | SYSTOLIC BLOOD PRESSURE: 132 MMHG

## 2022-07-11 DIAGNOSIS — Z86.010 HISTORY OF COLON POLYPS: ICD-10-CM

## 2022-07-11 PROCEDURE — 45382 COLONOSCOPY W/CONTROL BLEED: CPT | Performed by: INTERNAL MEDICINE

## 2022-07-11 RX ORDER — SODIUM CHLORIDE, SODIUM LACTATE, POTASSIUM CHLORIDE, CALCIUM CHLORIDE 600; 310; 30; 20 MG/100ML; MG/100ML; MG/100ML; MG/100ML
125 INJECTION, SOLUTION INTRAVENOUS CONTINUOUS
Status: DISCONTINUED | OUTPATIENT
Start: 2022-07-11 | End: 2022-07-15 | Stop reason: HOSPADM

## 2022-07-11 RX ORDER — SODIUM CHLORIDE, SODIUM LACTATE, POTASSIUM CHLORIDE, CALCIUM CHLORIDE 600; 310; 30; 20 MG/100ML; MG/100ML; MG/100ML; MG/100ML
INJECTION, SOLUTION INTRAVENOUS CONTINUOUS PRN
Status: DISCONTINUED | OUTPATIENT
Start: 2022-07-11 | End: 2022-07-11

## 2022-07-11 RX ORDER — PROPOFOL 10 MG/ML
INJECTION, EMULSION INTRAVENOUS AS NEEDED
Status: DISCONTINUED | OUTPATIENT
Start: 2022-07-11 | End: 2022-07-11

## 2022-07-11 RX ORDER — PROPOFOL 10 MG/ML
INJECTION, EMULSION INTRAVENOUS CONTINUOUS PRN
Status: DISCONTINUED | OUTPATIENT
Start: 2022-07-11 | End: 2022-07-11

## 2022-07-11 RX ORDER — SODIUM CHLORIDE, SODIUM LACTATE, POTASSIUM CHLORIDE, CALCIUM CHLORIDE 600; 310; 30; 20 MG/100ML; MG/100ML; MG/100ML; MG/100ML
20 INJECTION, SOLUTION INTRAVENOUS CONTINUOUS
Status: DISCONTINUED | OUTPATIENT
Start: 2022-07-11 | End: 2022-07-15 | Stop reason: HOSPADM

## 2022-07-11 RX ORDER — SODIUM CHLORIDE, SODIUM LACTATE, POTASSIUM CHLORIDE, CALCIUM CHLORIDE 600; 310; 30; 20 MG/100ML; MG/100ML; MG/100ML; MG/100ML
75 INJECTION, SOLUTION INTRAVENOUS CONTINUOUS
Status: DISCONTINUED | OUTPATIENT
Start: 2022-07-11 | End: 2022-07-15 | Stop reason: HOSPADM

## 2022-07-11 RX ADMIN — PROPOFOL 100 MG: 10 INJECTION, EMULSION INTRAVENOUS at 10:35

## 2022-07-11 RX ADMIN — SODIUM CHLORIDE, SODIUM LACTATE, POTASSIUM CHLORIDE, AND CALCIUM CHLORIDE 125 ML/HR: .6; .31; .03; .02 INJECTION, SOLUTION INTRAVENOUS at 09:45

## 2022-07-11 RX ADMIN — PROPOFOL 120 MCG/KG/MIN: 10 INJECTION, EMULSION INTRAVENOUS at 10:35

## 2022-07-11 RX ADMIN — SODIUM CHLORIDE, SODIUM LACTATE, POTASSIUM CHLORIDE, AND CALCIUM CHLORIDE: .6; .31; .03; .02 INJECTION, SOLUTION INTRAVENOUS at 10:27

## 2022-07-11 NOTE — ANESTHESIA PREPROCEDURE EVALUATION
Procedure:  COLONOSCOPY    Relevant Problems   CARDIO   (+) Essential hypertension      /RENAL   (+) Benign prostatic hyperplasia without lower urinary tract symptoms      Nervous and Auditory   (+) Hearing loss        Physical Exam    Airway    Mallampati score: II  TM Distance: >3 FB  Neck ROM: full     Dental   No notable dental hx     Cardiovascular  Cardiovascular exam normal    Pulmonary  Pulmonary exam normal     Other Findings        Anesthesia Plan  ASA Score- 2     Anesthesia Type- IV sedation with anesthesia with ASA Monitors  Additional Monitors:   Airway Plan:           Plan Factors-Exercise tolerance (METS): >4 METS  Chart reviewed  EKG reviewed  Imaging results reviewed  Existing labs reviewed  Patient summary reviewed  Patient is not a current smoker  Induction- intravenous  Postoperative Plan-     Informed Consent- Anesthetic plan and risks discussed with patient  I personally reviewed this patient with the CRNA  Discussed and agreed on the Anesthesia Plan with the CRNA  Dara Soulier

## 2022-07-11 NOTE — H&P
History and Physical -  [facility]  Rober Barney 73 y o  male MRN: [id number]                  HPI: Rober Barney is a 73y o  year old male who presents for a history of colon polyps  REVIEW OF SYSTEMS: Per the HPI, and otherwise unremarkable  Historical Information   Past Medical History:   Diagnosis Date    Benign renovascular hypertension     Colon polyp     Hyperlipidemia      Past Surgical History:   Procedure Laterality Date    COLONOSCOPY  2007    Normal      Social History   Social History     Substance and Sexual Activity   Alcohol Use Never     Social History     Substance and Sexual Activity   Drug Use Never     Social History     Tobacco Use   Smoking Status Never Smoker   Smokeless Tobacco Never Used     Family History   Problem Relation Age of Onset    Pancreatic cancer Mother     Heart disease Father        Meds/Allergies       Current Outpatient Medications:     lisinopril (ZESTRIL) 10 mg tablet    metoprolol succinate (TOPROL-XL) 25 mg 24 hr tablet    tamsulosin (FLOMAX) 0 4 mg    Current Facility-Administered Medications:     lactated ringers infusion, 125 mL/hr, Intravenous, Continuous, 125 mL/hr at 07/11/22 0945    lactated ringers infusion, 75 mL/hr, Intravenous, Continuous    Facility-Administered Medications Ordered in Other Encounters:     lactated ringers infusion, , Intravenous, Continuous PRN, New Bag at 07/11/22 1027    No Known Allergies    Objective     /94   Pulse 80   Temp (!) 97 °F (36 1 °C) (Temporal)   Resp 20   Ht 6' (1 829 m)   Wt 101 kg (223 lb)   SpO2 98%   BMI 30 24 kg/m²       PHYSICAL EXAM    Gen: NAD  Head: NCAT  CV: RRR  CHEST: Clear  ABD: soft, NT/ND  EXT: no edema      ASSESSMENT/PLAN:  This is a 73y o  year old male here for colonoscopy, and he is stable and optimized for his procedure

## 2022-07-11 NOTE — H&P
History and Physical -  [facility]  Tanika Carlos 73 y o  male MRN: [id number]                  HPI: Tanika Carlos is a 73y o  year old male who presents for colon cancer screening  REVIEW OF SYSTEMS: Per the HPI, and otherwise unremarkable  Historical Information   Past Medical History:   Diagnosis Date    Benign renovascular hypertension     Colon polyp     Hyperlipidemia      Past Surgical History:   Procedure Laterality Date    COLONOSCOPY  2007    Normal      Social History   Social History     Substance and Sexual Activity   Alcohol Use Never     Social History     Substance and Sexual Activity   Drug Use Never     Social History     Tobacco Use   Smoking Status Never Smoker   Smokeless Tobacco Never Used     Family History   Problem Relation Age of Onset    Pancreatic cancer Mother     Heart disease Father        Meds/Allergies       Current Outpatient Medications:     lisinopril (ZESTRIL) 10 mg tablet    metoprolol succinate (TOPROL-XL) 25 mg 24 hr tablet    tamsulosin (FLOMAX) 0 4 mg    Current Facility-Administered Medications:     lactated ringers infusion, 125 mL/hr, Intravenous, Continuous, 125 mL/hr at 07/11/22 0945    lactated ringers infusion, 20 mL/hr, Intravenous, Continuous    lactated ringers infusion, 75 mL/hr, Intravenous, Continuous    No Known Allergies    Objective     /78   Pulse 68   Temp 97 9 °F (36 6 °C) (Temporal)   Resp 20   Ht 6' (1 829 m)   Wt 101 kg (223 lb)   SpO2 97%   BMI 30 24 kg/m²       PHYSICAL EXAM    Gen: NAD  Head: NCAT  CV: RRR  CHEST: Clear  ABD: soft, NT/ND  EXT: no edema      ASSESSMENT/PLAN:  This is a 73y o  year old male here for colonoscopy, and he is stable and optimized for his procedure

## 2022-07-11 NOTE — ANESTHESIA POSTPROCEDURE EVALUATION
Post-Op Assessment Note    CV Status:  Stable  Pain Score: 0    Pain management: adequate     Mental Status:  Alert and awake   Hydration Status:  Euvolemic   PONV Controlled:  Controlled   Airway Patency:  Patent      Post Op Vitals Reviewed: Yes      Staff: CRNA         No complications documented      BP   110/56   Temp  97   Pulse  68   Resp   12   SpO2   98

## 2022-08-01 DIAGNOSIS — Z12.5 ENCOUNTER FOR PROSTATE CANCER SCREENING: ICD-10-CM

## 2022-08-01 RX ORDER — TAMSULOSIN HYDROCHLORIDE 0.4 MG/1
0.4 CAPSULE ORAL DAILY
Qty: 90 CAPSULE | Refills: 0 | Status: SHIPPED | OUTPATIENT
Start: 2022-08-01 | End: 2022-10-07 | Stop reason: SDUPTHER

## 2022-10-07 DIAGNOSIS — Z12.5 ENCOUNTER FOR PROSTATE CANCER SCREENING: ICD-10-CM

## 2022-10-07 RX ORDER — TAMSULOSIN HYDROCHLORIDE 0.4 MG/1
0.4 CAPSULE ORAL DAILY
Qty: 90 CAPSULE | Refills: 0 | Status: SHIPPED | OUTPATIENT
Start: 2022-10-07

## 2022-10-17 ENCOUNTER — TELEPHONE (OUTPATIENT)
Dept: FAMILY MEDICINE CLINIC | Facility: CLINIC | Age: 67
End: 2022-10-17

## 2022-10-17 NOTE — TELEPHONE ENCOUNTER
Pt tested positive on the weekend for gabino Lowery He has a cough with thick yellow, light green mucus    sinus is inflamed & burning is asking if he can get paxlovid prescribed for him ? ?? If his wife answers a call back he said to speak to her

## 2022-10-18 DIAGNOSIS — U07.1 COVID-19 VIRUS INFECTION: Primary | ICD-10-CM

## 2022-10-18 NOTE — TELEPHONE ENCOUNTER
If he has shortness of breath or low oxygen if able to measure at home below 90% needs to go to the ER  Paxlovid sent to his pharmacy  This medication must be taken within 5 days of symptom onset    May cause a metallic taste in the mouth, nausea, and might raise liver enzymes advise pt

## 2022-10-18 NOTE — PROGRESS NOTES
Jus from [facility] called questioning the dosage of paxlovid  The amt prescribed was for pt's with kidney issues but since the pt's kidney function is ok Dr Dominga Streeter said to increase dosage to regular amount   Josefina's notified

## 2022-11-03 ENCOUNTER — TELEPHONE (OUTPATIENT)
Dept: FAMILY MEDICINE CLINIC | Facility: CLINIC | Age: 67
End: 2022-11-03

## 2022-11-03 DIAGNOSIS — I10 ESSENTIAL HYPERTENSION: Primary | ICD-10-CM

## 2022-11-03 DIAGNOSIS — Z11.59 ENCOUNTER FOR HEPATITIS C SCREENING TEST FOR LOW RISK PATIENT: ICD-10-CM

## 2022-11-03 DIAGNOSIS — R73.09 ELEVATED GLUCOSE: ICD-10-CM

## 2022-11-03 DIAGNOSIS — D72.819 LEUKOPENIA, UNSPECIFIED TYPE: ICD-10-CM

## 2022-11-03 NOTE — TELEPHONE ENCOUNTER
Pt is scheduled to see Dr Robin Sanders 01/4 for a phy and needs labs ordered    pls call pt to let him know thy have been ordered  His wife see's Dr Robin Sanders and she wanted him to see her

## 2022-12-27 ENCOUNTER — APPOINTMENT (OUTPATIENT)
Dept: LAB | Facility: CLINIC | Age: 67
End: 2022-12-27

## 2022-12-27 DIAGNOSIS — I10 ESSENTIAL HYPERTENSION: ICD-10-CM

## 2022-12-27 DIAGNOSIS — Z11.59 ENCOUNTER FOR HEPATITIS C SCREENING TEST FOR LOW RISK PATIENT: ICD-10-CM

## 2022-12-27 DIAGNOSIS — D72.819 LEUKOPENIA, UNSPECIFIED TYPE: ICD-10-CM

## 2022-12-27 DIAGNOSIS — R73.09 ELEVATED GLUCOSE: ICD-10-CM

## 2022-12-27 LAB
ALBUMIN SERPL BCP-MCNC: 3.7 G/DL (ref 3.5–5)
ALP SERPL-CCNC: 46 U/L (ref 46–116)
ALT SERPL W P-5'-P-CCNC: 25 U/L (ref 12–78)
ANION GAP SERPL CALCULATED.3IONS-SCNC: 5 MMOL/L (ref 4–13)
AST SERPL W P-5'-P-CCNC: 18 U/L (ref 5–45)
BASOPHILS # BLD AUTO: 0.03 THOUSANDS/ÂΜL (ref 0–0.1)
BASOPHILS NFR BLD AUTO: 1 % (ref 0–1)
BILIRUB SERPL-MCNC: 0.49 MG/DL (ref 0.2–1)
BUN SERPL-MCNC: 17 MG/DL (ref 5–25)
CALCIUM SERPL-MCNC: 9 MG/DL (ref 8.3–10.1)
CHLORIDE SERPL-SCNC: 107 MMOL/L (ref 96–108)
CHOLEST SERPL-MCNC: 175 MG/DL
CO2 SERPL-SCNC: 28 MMOL/L (ref 21–32)
CREAT SERPL-MCNC: 1.03 MG/DL (ref 0.6–1.3)
EOSINOPHIL # BLD AUTO: 0.03 THOUSAND/ÂΜL (ref 0–0.61)
EOSINOPHIL NFR BLD AUTO: 1 % (ref 0–6)
ERYTHROCYTE [DISTWIDTH] IN BLOOD BY AUTOMATED COUNT: 13.2 % (ref 11.6–15.1)
GFR SERPL CREATININE-BSD FRML MDRD: 75 ML/MIN/1.73SQ M
GLUCOSE P FAST SERPL-MCNC: 93 MG/DL (ref 65–99)
HCT VFR BLD AUTO: 47 % (ref 36.5–49.3)
HCV AB SER QL: NORMAL
HDLC SERPL-MCNC: 46 MG/DL
HGB BLD-MCNC: 15 G/DL (ref 12–17)
IMM GRANULOCYTES # BLD AUTO: 0.01 THOUSAND/UL (ref 0–0.2)
IMM GRANULOCYTES NFR BLD AUTO: 0 % (ref 0–2)
LDLC SERPL CALC-MCNC: 110 MG/DL (ref 0–100)
LYMPHOCYTES # BLD AUTO: 1.53 THOUSANDS/ÂΜL (ref 0.6–4.47)
LYMPHOCYTES NFR BLD AUTO: 33 % (ref 14–44)
MCH RBC QN AUTO: 30.9 PG (ref 26.8–34.3)
MCHC RBC AUTO-ENTMCNC: 31.9 G/DL (ref 31.4–37.4)
MCV RBC AUTO: 97 FL (ref 82–98)
MONOCYTES # BLD AUTO: 0.61 THOUSAND/ÂΜL (ref 0.17–1.22)
MONOCYTES NFR BLD AUTO: 13 % (ref 4–12)
NEUTROPHILS # BLD AUTO: 2.5 THOUSANDS/ÂΜL (ref 1.85–7.62)
NEUTS SEG NFR BLD AUTO: 52 % (ref 43–75)
NRBC BLD AUTO-RTO: 0 /100 WBCS
PLATELET # BLD AUTO: 207 THOUSANDS/UL (ref 149–390)
PMV BLD AUTO: 11 FL (ref 8.9–12.7)
POTASSIUM SERPL-SCNC: 4.6 MMOL/L (ref 3.5–5.3)
PROT SERPL-MCNC: 7.1 G/DL (ref 6.4–8.4)
RBC # BLD AUTO: 4.86 MILLION/UL (ref 3.88–5.62)
SODIUM SERPL-SCNC: 140 MMOL/L (ref 135–147)
TRIGL SERPL-MCNC: 95 MG/DL
WBC # BLD AUTO: 4.71 THOUSAND/UL (ref 4.31–10.16)

## 2022-12-28 LAB
EST. AVERAGE GLUCOSE BLD GHB EST-MCNC: 108 MG/DL
HBA1C MFR BLD: 5.4 %

## 2023-01-04 ENCOUNTER — OFFICE VISIT (OUTPATIENT)
Dept: FAMILY MEDICINE CLINIC | Facility: CLINIC | Age: 68
End: 2023-01-04

## 2023-01-04 VITALS
WEIGHT: 226.8 LBS | OXYGEN SATURATION: 94 % | TEMPERATURE: 97.8 F | DIASTOLIC BLOOD PRESSURE: 78 MMHG | BODY MASS INDEX: 30.72 KG/M2 | HEIGHT: 72 IN | HEART RATE: 86 BPM | SYSTOLIC BLOOD PRESSURE: 130 MMHG

## 2023-01-04 DIAGNOSIS — Z13.31 ENCOUNTER FOR SCREENING FOR DEPRESSION: ICD-10-CM

## 2023-01-04 DIAGNOSIS — N40.0 BENIGN PROSTATIC HYPERPLASIA WITHOUT LOWER URINARY TRACT SYMPTOMS: ICD-10-CM

## 2023-01-04 DIAGNOSIS — Z00.00 MEDICARE ANNUAL WELLNESS VISIT, INITIAL: ICD-10-CM

## 2023-01-04 DIAGNOSIS — I10 ESSENTIAL HYPERTENSION: Primary | ICD-10-CM

## 2023-01-04 DIAGNOSIS — E66.09 CLASS 1 OBESITY DUE TO EXCESS CALORIES WITH SERIOUS COMORBIDITY AND BODY MASS INDEX (BMI) OF 30.0 TO 30.9 IN ADULT: ICD-10-CM

## 2023-01-04 PROBLEM — E66.811 CLASS 1 OBESITY DUE TO EXCESS CALORIES WITH SERIOUS COMORBIDITY AND BODY MASS INDEX (BMI) OF 30.0 TO 30.9 IN ADULT: Status: ACTIVE | Noted: 2023-01-04

## 2023-01-04 NOTE — PROGRESS NOTES
Assessment and Plan:     Problem List Items Addressed This Visit        Cardiovascular and Mediastinum    Essential hypertension - Primary     Well-controlled on lisinopril and metoprolol  Continue same regimen            Genitourinary    Benign prostatic hyperplasia without lower urinary tract symptoms     Symptoms are controlled on Flomax 0 4 mg daily  Reviewed recent PSA in February 22 was normal             Other    Class 1 obesity due to excess calories with serious comorbidity and body mass index (BMI) of 30 0 to 30 9 in adult   Other Visit Diagnoses     Medicare annual wellness visit, initial        Encounter for screening for depression            BMI Counseling: Body mass index is 30 76 kg/m²  The BMI is above normal  Nutrition recommendations include encouraging healthy choices of fruits and vegetables  Exercise recommendations include exercising 3-5 times per week  No pharmacotherapy was ordered  Rationale for BMI follow-up plan is due to patient being overweight or obese  Depression Screening and Follow-up Plan: Patient was screened for depression during today's encounter  They screened negative with a PHQ-2 score of 0  Preventive health issues were discussed with patient, and age appropriate screening tests were ordered as noted in patient's After Visit Summary  Personalized health advice and appropriate referrals for health education or preventive services given if needed, as noted in patient's After Visit Summary  History of Present Illness:     Patient presents for a Medicare Wellness Visit    78year old new to me  He was seen about a year ago  He had labs done which we reviewed- A1c is normal, CMP, CBC normal  Lipids - stable,   His PSA was done in February 2022 was normal   He is on tamsulosin for BPH  He states his symptoms are well controlled  He has no acute complaints today       Patient Care Team:  Melissa Romero MD as PCP - General (Family Medicine)     Review of Systems:     Review of Systems   Constitutional: Negative for activity change, appetite change, chills, fatigue, fever and unexpected weight change  HENT: Negative for congestion, ear discharge, ear pain, postnasal drip, sinus pressure and sore throat  Eyes: Negative for discharge and visual disturbance  Respiratory: Negative for cough, shortness of breath and wheezing  Cardiovascular: Negative for chest pain, palpitations and leg swelling  Gastrointestinal: Negative for abdominal pain, constipation, diarrhea, nausea and vomiting  Endocrine: Negative for cold intolerance, heat intolerance, polydipsia and polyuria  Genitourinary: Negative for difficulty urinating and frequency  Musculoskeletal: Negative for arthralgias, back pain, joint swelling and myalgias  Skin: Negative for rash  Neurological: Negative for dizziness, weakness, light-headedness, numbness and headaches  Hematological: Negative for adenopathy  Psychiatric/Behavioral: Negative for behavioral problems, confusion, dysphoric mood, sleep disturbance and suicidal ideas  The patient is not nervous/anxious           Problem List:     Patient Active Problem List   Diagnosis   • Benign prostatic hyperplasia without lower urinary tract symptoms   • Hearing loss   • Encounter for prostate cancer screening   • Essential hypertension   • Class 1 obesity due to excess calories with serious comorbidity and body mass index (BMI) of 30 0 to 30 9 in adult      Past Medical and Surgical History:     Past Medical History:   Diagnosis Date   • Benign renovascular hypertension    • Colon polyp    • Hyperlipidemia      Past Surgical History:   Procedure Laterality Date   • COLONOSCOPY  2007    Normal       Family History:     Family History   Problem Relation Age of Onset   • Pancreatic cancer Mother    • Heart disease Father       Social History:     Social History     Socioeconomic History   • Marital status: /Civil Union     Spouse name: None   • Number of children: None   • Years of education: None   • Highest education level: None   Occupational History   • Occupation: Teacher    Tobacco Use   • Smoking status: Never   • Smokeless tobacco: Never   Vaping Use   • Vaping Use: Never used   Substance and Sexual Activity   • Alcohol use: Never   • Drug use: Never   • Sexual activity: None   Other Topics Concern   • None   Social History Narrative   • None     Social Determinants of Health     Financial Resource Strain: Low Risk    • Difficulty of Paying Living Expenses: Not hard at all   Food Insecurity: Not on file   Transportation Needs: No Transportation Needs   • Lack of Transportation (Medical): No   • Lack of Transportation (Non-Medical): No   Physical Activity: Not on file   Stress: Not on file   Social Connections: Not on file   Intimate Partner Violence: Not on file   Housing Stability: Not on file      Medications and Allergies:     Current Outpatient Medications   Medication Sig Dispense Refill   • lisinopril (ZESTRIL) 10 mg tablet Take 1 tablet (10 mg total) by mouth daily 90 tablet 5   • metoprolol succinate (TOPROL-XL) 25 mg 24 hr tablet Take 1 tablet (25 mg total) by mouth daily 90 tablet 3   • tamsulosin (FLOMAX) 0 4 mg Take 1 capsule (0 4 mg total) by mouth daily 90 capsule 0     No current facility-administered medications for this visit       No Known Allergies   Immunizations:     Immunization History   Administered Date(s) Administered   • COVID-19 MODERNA VACC 0 5 ML IM 03/16/2021   • INFLUENZA 10/04/2021, 11/16/2022   • Influenza, recombinant, quadrivalent,injectable, preservative free 10/26/2020      Health Maintenance:         Topic Date Due   • Colorectal Cancer Screening  07/08/2032   • Hepatitis C Screening  Completed         Topic Date Due   • Hepatitis B Vaccine (1 of 3 - 3-dose series) Never done   • COVID-19 Vaccine (2 - Moderna series) 04/13/2021      Medicare Screening Tests and Risk Assessments:     Shannon Hayes is here for his Subsequent Wellness visit  Last Medicare Wellness visit information reviewed, patient interviewed, no change since last AWV  Health Risk Assessment:   Patient rates overall health as excellent  Patient feels that their physical health rating is same  Patient is very satisfied with their life  Eyesight was rated as same  Hearing was rated as same  Patient feels that their emotional and mental health rating is same  Patients states they are never, rarely angry  Patient states they are never, rarely unusually tired/fatigued  Pain experienced in the last 7 days has been none  Patient states that he has experienced no weight loss or gain in last 6 months  Depression Screening:   PHQ-2 Score: 0      Fall Risk Screening: In the past year, patient has experienced: no history of falling in past year      Home Safety:  Patient does not have trouble with stairs inside or outside of their home  Patient has working smoke alarms and has working carbon monoxide detector  Home safety hazards include: none  Nutrition:   Current diet is Regular  Medications:   Patient is not currently taking any over-the-counter supplements  Patient is able to manage medications  Activities of Daily Living (ADLs)/Instrumental Activities of Daily Living (IADLs):   Walk and transfer into and out of bed and chair?: Yes  Dress and groom yourself?: Yes    Bathe or shower yourself?: Yes    Feed yourself?  Yes  Do your laundry/housekeeping?: Yes  Manage your money, pay your bills and track your expenses?: Yes  Make your own meals?: Yes    Do your own shopping?: Yes    Previous Hospitalizations:   Any hospitalizations or ED visits within the last 12 months?: No      Advance Care Planning:   Living will: No    Durable POA for healthcare: No    Advanced directive: No      Cognitive Screening:   Provider or family/friend/caregiver concerned regarding cognition?: No    PREVENTIVE SCREENINGS      Cardiovascular Screening:    General: Screening Not Indicated, History Lipid Disorder, Risks and Benefits Discussed and Screening Current      Diabetes Screening:     General: Screening Current and Risks and Benefits Discussed      Colorectal Cancer Screening:     General: Screening Current      Prostate Cancer Screening:    General: Risks and Benefits Discussed and Screening Current      Osteoporosis Screening:    General: Screening Not Indicated      Abdominal Aortic Aneurysm (AAA) Screening:    Risk factors include: age between 73-67 yo        General: Screening Not Indicated      Lung Cancer Screening:     General: Screening Not Indicated      Hepatitis C Screening:    General: Screening Current    Screening, Brief Intervention, and Referral to Treatment (SBIRT)    Screening  Typical number of drinks in a day: 0    Single Item Drug Screening:  How often have you used an illegal drug (including marijuana) or a prescription medication for non-medical reasons in the past year? never    Single Item Drug Screen Score: 0  Interpretation: Negative screen for possible drug use disorder    Brief Intervention  Alcohol & drug use screenings were reviewed  No concerns regarding substance use disorder identified  No results found  Physical Exam:     /78 (BP Location: Left arm, Patient Position: Sitting)   Pulse 86   Temp 97 8 °F (36 6 °C) (Temporal)   Ht 6' (1 829 m)   Wt 103 kg (226 lb 12 8 oz)   SpO2 94%   BMI 30 76 kg/m²     Physical Exam  Vitals and nursing note reviewed  Constitutional:       General: He is not in acute distress  Appearance: He is well-developed  He is not diaphoretic  HENT:      Head: Normocephalic and atraumatic        Right Ear: Tympanic membrane, ear canal and external ear normal       Left Ear: Tympanic membrane, ear canal and external ear normal       Ears:      Comments: Hearing aids removed for exam     Nose: Nose normal       Mouth/Throat:      Mouth: Mucous membranes are moist       Pharynx: Oropharynx is clear  No oropharyngeal exudate  Eyes:      General: No scleral icterus  Right eye: No discharge  Left eye: No discharge  Conjunctiva/sclera: Conjunctivae normal       Pupils: Pupils are equal, round, and reactive to light  Neck:      Thyroid: No thyromegaly  Cardiovascular:      Rate and Rhythm: Normal rate and regular rhythm  Heart sounds: Normal heart sounds  No murmur heard  No friction rub  No gallop  Pulmonary:      Effort: Pulmonary effort is normal  No respiratory distress  Breath sounds: Normal breath sounds  No wheezing or rales  Abdominal:      General: Bowel sounds are normal  There is no distension  Palpations: Abdomen is soft  There is no mass  Tenderness: There is no abdominal tenderness  There is no guarding or rebound  Hernia: No hernia is present  Musculoskeletal:         General: No swelling  Right lower leg: No edema  Left lower leg: No edema  Lymphadenopathy:      Cervical: No cervical adenopathy  Skin:     General: Skin is warm and dry  Findings: No rash  Neurological:      General: No focal deficit present  Mental Status: He is alert and oriented to person, place, and time  Mental status is at baseline  Cranial Nerves: No cranial nerve deficit  Gait: Gait normal    Psychiatric:         Mood and Affect: Mood normal          Behavior: Behavior normal          Thought Content:  Thought content normal          Judgment: Judgment normal           Gabino Roy MD

## 2023-01-09 DIAGNOSIS — I10 ESSENTIAL HYPERTENSION: ICD-10-CM

## 2023-01-09 RX ORDER — METOPROLOL SUCCINATE 25 MG/1
25 TABLET, EXTENDED RELEASE ORAL DAILY
Qty: 90 TABLET | Refills: 3 | Status: SHIPPED | OUTPATIENT
Start: 2023-01-09

## 2023-01-24 DIAGNOSIS — Z12.5 ENCOUNTER FOR PROSTATE CANCER SCREENING: ICD-10-CM

## 2023-01-24 RX ORDER — TAMSULOSIN HYDROCHLORIDE 0.4 MG/1
0.4 CAPSULE ORAL DAILY
Qty: 90 CAPSULE | Refills: 0 | Status: SHIPPED | OUTPATIENT
Start: 2023-01-24

## 2023-03-24 DIAGNOSIS — I10 ESSENTIAL HYPERTENSION: ICD-10-CM

## 2023-03-24 RX ORDER — LISINOPRIL 10 MG/1
10 TABLET ORAL DAILY
Qty: 90 TABLET | Refills: 3 | Status: SHIPPED | OUTPATIENT
Start: 2023-03-24

## 2023-05-04 DIAGNOSIS — Z12.5 ENCOUNTER FOR PROSTATE CANCER SCREENING: ICD-10-CM

## 2023-05-04 RX ORDER — TAMSULOSIN HYDROCHLORIDE 0.4 MG/1
0.4 CAPSULE ORAL DAILY
Qty: 90 CAPSULE | Refills: 1 | Status: SHIPPED | OUTPATIENT
Start: 2023-05-04

## 2023-10-02 ENCOUNTER — HOSPITAL ENCOUNTER (EMERGENCY)
Facility: HOSPITAL | Age: 68
Discharge: HOME | End: 2023-10-02
Attending: EMERGENCY MEDICINE
Payer: MEDICARE

## 2023-10-02 ENCOUNTER — APPOINTMENT (EMERGENCY)
Dept: CT IMAGING | Facility: HOSPITAL | Age: 68
End: 2023-10-02
Payer: MEDICARE

## 2023-10-02 VITALS
HEART RATE: 100 BPM | OXYGEN SATURATION: 97 % | SYSTOLIC BLOOD PRESSURE: 130 MMHG | TEMPERATURE: 97.7 F | DIASTOLIC BLOOD PRESSURE: 70 MMHG | WEIGHT: 226 LBS | RESPIRATION RATE: 21 BRPM | BODY MASS INDEX: 30.65 KG/M2

## 2023-10-02 DIAGNOSIS — N20.0 RIGHT RENAL STONE: ICD-10-CM

## 2023-10-02 DIAGNOSIS — R11.2 NAUSEA & VOMITING: ICD-10-CM

## 2023-10-02 DIAGNOSIS — R10.9 ABDOMINAL PAIN: ICD-10-CM

## 2023-10-02 DIAGNOSIS — N20.1 LEFT URETERAL CALCULUS: Primary | ICD-10-CM

## 2023-10-02 LAB
ALBUMIN SERPL BCP-MCNC: 4.5 G/DL (ref 3.5–5)
ALP SERPL-CCNC: 43 U/L (ref 34–104)
ALT SERPL W P-5'-P-CCNC: 15 U/L (ref 7–52)
ANION GAP SERPL CALCULATED.3IONS-SCNC: 12 MMOL/L
AST SERPL W P-5'-P-CCNC: 16 U/L (ref 13–39)
BACTERIA UR QL AUTO: ABNORMAL /HPF
BASOPHILS # BLD AUTO: 0.04 THOUSANDS/ÂΜL (ref 0–0.1)
BASOPHILS NFR BLD AUTO: 0 % (ref 0–1)
BILIRUB SERPL-MCNC: 0.59 MG/DL (ref 0.2–1)
BILIRUB UR QL STRIP: NEGATIVE
BUN SERPL-MCNC: 18 MG/DL (ref 5–25)
CALCIUM SERPL-MCNC: 9.5 MG/DL (ref 8.4–10.2)
CHLORIDE SERPL-SCNC: 103 MMOL/L (ref 96–108)
CLARITY UR: CLEAR
CO2 SERPL-SCNC: 23 MMOL/L (ref 21–32)
COLOR UR: YELLOW
CREAT SERPL-MCNC: 1.27 MG/DL (ref 0.6–1.3)
EOSINOPHIL # BLD AUTO: 0.01 THOUSAND/ÂΜL (ref 0–0.61)
EOSINOPHIL NFR BLD AUTO: 0 % (ref 0–6)
ERYTHROCYTE [DISTWIDTH] IN BLOOD BY AUTOMATED COUNT: 12.5 % (ref 11.6–15.1)
GFR SERPL CREATININE-BSD FRML MDRD: 58 ML/MIN/1.73SQ M
GLUCOSE SERPL-MCNC: 135 MG/DL (ref 65–140)
GLUCOSE UR STRIP-MCNC: NEGATIVE MG/DL
HCT VFR BLD AUTO: 46 % (ref 36.5–49.3)
HGB BLD-MCNC: 15.6 G/DL (ref 12–17)
HGB UR QL STRIP.AUTO: ABNORMAL
IMM GRANULOCYTES # BLD AUTO: 0.05 THOUSAND/UL (ref 0–0.2)
IMM GRANULOCYTES NFR BLD AUTO: 0 % (ref 0–2)
KETONES UR STRIP-MCNC: NEGATIVE MG/DL
LACTATE SERPL-SCNC: 1.2 MMOL/L (ref 0.5–2)
LACTATE SERPL-SCNC: 3 MMOL/L (ref 0.5–2)
LEUKOCYTE ESTERASE UR QL STRIP: NEGATIVE
LIPASE SERPL-CCNC: 25 U/L (ref 11–82)
LYMPHOCYTES # BLD AUTO: 1.12 THOUSANDS/ÂΜL (ref 0.6–4.47)
LYMPHOCYTES NFR BLD AUTO: 10 % (ref 14–44)
MAGNESIUM SERPL-MCNC: 1.9 MG/DL (ref 1.9–2.7)
MCH RBC QN AUTO: 32.2 PG (ref 26.8–34.3)
MCHC RBC AUTO-ENTMCNC: 33.9 G/DL (ref 31.4–37.4)
MCV RBC AUTO: 95 FL (ref 82–98)
MONOCYTES # BLD AUTO: 0.83 THOUSAND/ÂΜL (ref 0.17–1.22)
MONOCYTES NFR BLD AUTO: 7 % (ref 4–12)
NEUTROPHILS # BLD AUTO: 9.75 THOUSANDS/ÂΜL (ref 1.85–7.62)
NEUTS SEG NFR BLD AUTO: 83 % (ref 43–75)
NITRITE UR QL STRIP: NEGATIVE
NON-SQ EPI CELLS URNS QL MICRO: ABNORMAL /HPF
NRBC BLD AUTO-RTO: 0 /100 WBCS
PH UR STRIP.AUTO: 7.5 [PH]
PLATELET # BLD AUTO: 200 THOUSANDS/UL (ref 149–390)
PMV BLD AUTO: 10.2 FL (ref 8.9–12.7)
POTASSIUM SERPL-SCNC: 4.1 MMOL/L (ref 3.5–5.3)
PROT SERPL-MCNC: 7.3 G/DL (ref 6.4–8.4)
PROT UR STRIP-MCNC: NEGATIVE MG/DL
RBC # BLD AUTO: 4.85 MILLION/UL (ref 3.88–5.62)
RBC #/AREA URNS AUTO: ABNORMAL /HPF
SODIUM SERPL-SCNC: 138 MMOL/L (ref 135–147)
SP GR UR STRIP.AUTO: 1.01
UROBILINOGEN UR QL STRIP.AUTO: 0.2 E.U./DL
WBC # BLD AUTO: 11.8 THOUSAND/UL (ref 4.31–10.16)
WBC #/AREA URNS AUTO: ABNORMAL /HPF

## 2023-10-02 PROCEDURE — 36415 COLL VENOUS BLD VENIPUNCTURE: CPT | Performed by: PHYSICIAN ASSISTANT

## 2023-10-02 PROCEDURE — 80053 COMPREHEN METABOLIC PANEL: CPT | Performed by: PHYSICIAN ASSISTANT

## 2023-10-02 PROCEDURE — 96375 TX/PRO/DX INJ NEW DRUG ADDON: CPT

## 2023-10-02 PROCEDURE — 83735 ASSAY OF MAGNESIUM: CPT | Performed by: PHYSICIAN ASSISTANT

## 2023-10-02 PROCEDURE — 83605 ASSAY OF LACTIC ACID: CPT | Performed by: PHYSICIAN ASSISTANT

## 2023-10-02 PROCEDURE — 96361 HYDRATE IV INFUSION ADD-ON: CPT

## 2023-10-02 PROCEDURE — 74177 CT ABD & PELVIS W/CONTRAST: CPT

## 2023-10-02 PROCEDURE — 81003 URINALYSIS AUTO W/O SCOPE: CPT | Performed by: PHYSICIAN ASSISTANT

## 2023-10-02 PROCEDURE — 96374 THER/PROPH/DIAG INJ IV PUSH: CPT

## 2023-10-02 PROCEDURE — 85025 COMPLETE CBC W/AUTO DIFF WBC: CPT | Performed by: PHYSICIAN ASSISTANT

## 2023-10-02 PROCEDURE — 93005 ELECTROCARDIOGRAM TRACING: CPT

## 2023-10-02 PROCEDURE — 83690 ASSAY OF LIPASE: CPT | Performed by: PHYSICIAN ASSISTANT

## 2023-10-02 PROCEDURE — 99284 EMERGENCY DEPT VISIT MOD MDM: CPT

## 2023-10-02 PROCEDURE — 81001 URINALYSIS AUTO W/SCOPE: CPT | Performed by: PHYSICIAN ASSISTANT

## 2023-10-02 PROCEDURE — 99285 EMERGENCY DEPT VISIT HI MDM: CPT | Performed by: PHYSICIAN ASSISTANT

## 2023-10-02 RX ORDER — ONDANSETRON 2 MG/ML
4 INJECTION INTRAMUSCULAR; INTRAVENOUS ONCE
Status: COMPLETED | OUTPATIENT
Start: 2023-10-02 | End: 2023-10-02

## 2023-10-02 RX ORDER — HYDROMORPHONE HCL/PF 1 MG/ML
0.5 SYRINGE (ML) INJECTION ONCE
Status: COMPLETED | OUTPATIENT
Start: 2023-10-02 | End: 2023-10-02

## 2023-10-02 RX ORDER — KETOROLAC TROMETHAMINE 30 MG/ML
15 INJECTION, SOLUTION INTRAMUSCULAR; INTRAVENOUS ONCE
Status: COMPLETED | OUTPATIENT
Start: 2023-10-02 | End: 2023-10-02

## 2023-10-02 RX ORDER — ONDANSETRON 4 MG/1
4 TABLET, ORALLY DISINTEGRATING ORAL EVERY 6 HOURS PRN
Qty: 10 TABLET | Refills: 0 | Status: SHIPPED | OUTPATIENT
Start: 2023-10-02

## 2023-10-02 RX ORDER — HYDROMORPHONE HCL/PF 1 MG/ML
0.5 SYRINGE (ML) INJECTION ONCE
Status: DISCONTINUED | OUTPATIENT
Start: 2023-10-02 | End: 2023-10-02

## 2023-10-02 RX ADMIN — KETOROLAC TROMETHAMINE 15 MG: 30 INJECTION, SOLUTION INTRAMUSCULAR; INTRAVENOUS at 10:01

## 2023-10-02 RX ADMIN — SODIUM CHLORIDE 1000 ML: 0.9 INJECTION, SOLUTION INTRAVENOUS at 10:15

## 2023-10-02 RX ADMIN — SODIUM CHLORIDE 1000 ML: 0.9 INJECTION, SOLUTION INTRAVENOUS at 11:25

## 2023-10-02 RX ADMIN — HYDROMORPHONE HYDROCHLORIDE 0.5 MG: 1 INJECTION, SOLUTION INTRAMUSCULAR; INTRAVENOUS; SUBCUTANEOUS at 11:08

## 2023-10-02 RX ADMIN — ONDANSETRON 4 MG: 2 INJECTION INTRAMUSCULAR; INTRAVENOUS at 10:02

## 2023-10-02 RX ADMIN — IOHEXOL 100 ML: 350 INJECTION, SOLUTION INTRAVENOUS at 10:45

## 2023-10-02 NOTE — ED PROVIDER NOTES
History  Chief Complaint   Patient presents with   • Anal Irritation   • Constipation     Patient is a 46-year-old male with a PMHx of BPH, HLD and HTN, presenting to the ED for evaluation of abdominal pain, constipation and difficulty urinating. Patient states that he woke up this morning with generalized abdominal pain and some low back discomfort. He thought this may be due to constipation so he attempted to have a bowel movement but was unable to have one. He states that he then put some soap and water on his finger and put it in his rectum to help facilitate a bowel movement. He was able to have a normal bowel movement; however, he believes that he may have irritated his prostate as he is now having difficulty urinating. He states that he is having frequent urges to urinate but is only passing small drops of urine and does not feel that he is emptying his bladder. He denies any dysuria or hematuria. He reports associated nausea and dry heaving. He denies any known fevers but has the chills. He denies chest pain or shortness of breath. He denies any numbness/weakness in lower extremities, bowel/bladder incontinence or saddle anesthesia. Prior to Admission Medications   Prescriptions Last Dose Informant Patient Reported?  Taking?   lisinopril (ZESTRIL) 10 mg tablet   No No   Sig: Take 1 tablet (10 mg total) by mouth daily   metoprolol succinate (TOPROL-XL) 25 mg 24 hr tablet   No No   Sig: Take 1 tablet (25 mg total) by mouth daily   tamsulosin (FLOMAX) 0.4 mg   No No   Sig: Take 1 capsule (0.4 mg total) by mouth daily      Facility-Administered Medications: None       Past Medical History:   Diagnosis Date   • Benign renovascular hypertension    • Colon polyp    • Hyperlipidemia        Past Surgical History:   Procedure Laterality Date   • COLONOSCOPY  2007    Normal        Family History   Problem Relation Age of Onset   • Pancreatic cancer Mother    • Heart disease Father      I have reviewed and agree with the history as documented. E-Cigarette/Vaping   • E-Cigarette Use Never User      E-Cigarette/Vaping Substances     Social History     Tobacco Use   • Smoking status: Never   • Smokeless tobacco: Never   Vaping Use   • Vaping Use: Never used   Substance Use Topics   • Alcohol use: Never   • Drug use: Never       Review of Systems   Constitutional: Positive for chills. Negative for fever. HENT: Negative for congestion, ear pain, rhinorrhea and sore throat. Eyes: Negative for photophobia and visual disturbance. Respiratory: Negative for cough and shortness of breath. Cardiovascular: Negative for chest pain, palpitations and leg swelling. Gastrointestinal: Positive for abdominal pain and constipation. Negative for blood in stool, diarrhea and vomiting. Genitourinary: Positive for decreased urine volume, difficulty urinating and urgency. Negative for dysuria, frequency and hematuria. Musculoskeletal: Positive for back pain. Negative for arthralgias, gait problem, joint swelling, myalgias and neck pain. Skin: Negative for rash. Neurological: Negative for dizziness, speech difficulty, weakness, numbness and headaches. All other systems reviewed and are negative. Physical Exam  Physical Exam  Vitals and nursing note reviewed. Constitutional:       General: He is awake. He is not in acute distress. Appearance: Normal appearance. He is well-developed. He is not ill-appearing or diaphoretic. HENT:      Head: Normocephalic and atraumatic. Right Ear: External ear normal.      Left Ear: External ear normal.      Nose: Nose normal.      Mouth/Throat:      Lips: Pink. Mouth: Mucous membranes are moist.   Eyes:      General: Lids are normal. No scleral icterus. Conjunctiva/sclera: Conjunctivae normal.      Pupils: Pupils are equal, round, and reactive to light. Cardiovascular:      Rate and Rhythm: Normal rate and regular rhythm. Pulses: Normal pulses.            Radial pulses are 2+ on the right side and 2+ on the left side. Heart sounds: Normal heart sounds, S1 normal and S2 normal.   Pulmonary:      Effort: Pulmonary effort is normal. No accessory muscle usage. Breath sounds: Normal breath sounds. No stridor. No decreased breath sounds, wheezing, rhonchi or rales. Abdominal:      General: Abdomen is flat. Bowel sounds are normal. There is no distension. Palpations: Abdomen is soft. Tenderness: There is generalized abdominal tenderness. There is no right CVA tenderness, left CVA tenderness, guarding or rebound. Comments: Generalized tenderness to palpation, worse on the left. No rebound tenderness, guarding or rigidity. Bladder scan showing 27 mL of urine. Genitourinary:     Comments: Normal rectal tone. No stool in rectal vault. Musculoskeletal:      Cervical back: Full passive range of motion without pain, normal range of motion and neck supple. No signs of trauma. No pain with movement. Normal range of motion. Right lower leg: No edema. Left lower leg: No edema. Lymphadenopathy:      Cervical: No cervical adenopathy. Skin:     General: Skin is warm and dry. Capillary Refill: Capillary refill takes less than 2 seconds. Coloration: Skin is not cyanotic, jaundiced or pale. Neurological:      Mental Status: He is alert and oriented to person, place, and time. GCS: GCS eye subscore is 4. GCS verbal subscore is 5. GCS motor subscore is 6. Cranial Nerves: No dysarthria or facial asymmetry. Gait: Gait normal.   Psychiatric:         Attention and Perception: Attention normal.         Mood and Affect: Mood normal.         Speech: Speech normal.         Behavior: Behavior normal. Behavior is cooperative.          Vital Signs  ED Triage Vitals [10/02/23 0918]   Temperature Pulse Respirations Blood Pressure SpO2   97.7 °F (36.5 °C) 76 18 153/76 99 %      Temp Source Heart Rate Source Patient Position - Orthostatic VS BP Location FiO2 (%)   Temporal Monitor -- -- --      Pain Score       7           Vitals:    10/02/23 0918 10/02/23 1346 10/02/23 1348   BP: 153/76  130/70   Pulse: 76 91 100         Visual Acuity      ED Medications  Medications   sodium chloride 0.9 % bolus 1,000 mL (0 mL Intravenous Stopped 10/2/23 1345)   ketorolac (TORADOL) injection 15 mg (15 mg Intravenous Given 10/2/23 1001)   ondansetron (ZOFRAN) injection 4 mg (4 mg Intravenous Given 10/2/23 1002)   iohexol (OMNIPAQUE) 350 MG/ML injection (MULTI-DOSE) 100 mL (100 mL Intravenous Given 10/2/23 1045)   HYDROmorphone (DILAUDID) injection 0.5 mg (0.5 mg Intravenous Given 10/2/23 1108)   sodium chloride 0.9 % bolus 1,000 mL (0 mL Intravenous Stopped 10/2/23 1345)       Diagnostic Studies  Results Reviewed     Procedure Component Value Units Date/Time    Lactic acid 2 Hours [[id number]]  (Normal) Collected: 10/02/23 1258    Lab Status: Final result Specimen: Blood from Arm, Left Updated: 10/02/23 1321     LACTIC ACID 1.2 mmol/L     Narrative:      Result may be elevated if tourniquet was used during collection. Lactic acid, plasma (w/reflex if result > 2.0) [[id number]]  (Abnormal) Collected: 10/02/23 1001    Lab Status: Final result Specimen: Blood from Arm, Left Updated: 10/02/23 1040     LACTIC ACID 3.0 mmol/L     Narrative:      Result may be elevated if tourniquet was used during collection.     Comprehensive metabolic panel [[id number]] Collected: 10/02/23 1001    Lab Status: Final result Specimen: Blood from Arm, Left Updated: 10/02/23 1028     Sodium 138 mmol/L      Potassium 4.1 mmol/L      Chloride 103 mmol/L      CO2 23 mmol/L      ANION GAP 12 mmol/L      BUN 18 mg/dL      Creatinine 1.27 mg/dL      Glucose 135 mg/dL      Calcium 9.5 mg/dL      AST 16 U/L      ALT 15 U/L      Alkaline Phosphatase 43 U/L      Total Protein 7.3 g/dL      Albumin 4.5 g/dL      Total Bilirubin 0.59 mg/dL      eGFR 58 ml/min/1.73sq m     Narrative:      National Kid[employer] Disease Foundation guidelines for Chronic Kidney Disease (CKD):   •  Stage 1 with normal or high GFR (GFR > 90 mL/min/1.73 square meters)  •  Stage 2 Mild CKD (GFR = 60-89 mL/min/1.73 square meters)  •  Stage 3A Moderate CKD (GFR = 45-59 mL/min/1.73 square meters)  •  Stage 3B Moderate CKD (GFR = 30-44 mL/min/1.73 square meters)  •  Stage 4 Severe CKD (GFR = 15-29 mL/min/1.73 square meters)  •  Stage 5 End Stage CKD (GFR <15 mL/min/1.73 square meters)  Note: GFR calculation is accurate only with a steady state creatinine    Lipase [[id number]]  (Normal) Collected: 10/02/23 1001    Lab Status: Final result Specimen: Blood from Arm, Left Updated: 10/02/23 1028     Lipase 25 u/L     Magnesium [[id number]]  (Normal) Collected: 10/02/23 1001    Lab Status: Final result Specimen: Blood from Arm, Left Updated: 10/02/23 1028     Magnesium 1.9 mg/dL     Urine Microscopic [[id number]]  (Abnormal) Collected: 10/02/23 1005    Lab Status: Final result Specimen: Urine, Clean Catch Updated: 10/02/23 1017     RBC, UA 10-20 /hpf      WBC, UA 0-1 /hpf      Epithelial Cells Occasional /hpf      Bacteria, UA None Seen /hpf     UA w Reflex to Microscopic w Reflex to Culture [[id number]]  (Abnormal) Collected: 10/02/23 1005    Lab Status: Final result Specimen: Urine, Clean Catch Updated: 10/02/23 1012     Color, UA Yellow     Clarity, UA Clear     Specific Gravity, UA 1.015     pH, UA 7.5     Leukocytes, UA Negative     Nitrite, UA Negative     Protein, UA Negative mg/dl      Glucose, UA Negative mg/dl      Ketones, UA Negative mg/dl      Urobilinogen, UA 0.2 E.U./dl      Bilirubin, UA Negative     Occult Blood, UA 2+    CBC and differential [[id number]]  (Abnormal) Collected: 10/02/23 1001    Lab Status: Final result Specimen: Blood from Arm, Left Updated: 10/02/23 1011     WBC 11.80 Thousand/uL      RBC 4.85 Million/uL      Hemoglobin 15.6 g/dL      Hematocrit 46.0 %      MCV 95 fL      MCH 32.2 pg      MCHC 33.9 g/dL      RDW 12.5 % MPV 10.2 fL      Platelets 297 Thousands/uL      nRBC 0 /100 WBCs      Neutrophils Relative 83 %      Immat GRANS % 0 %      Lymphocytes Relative 10 %      Monocytes Relative 7 %      Eosinophils Relative 0 %      Basophils Relative 0 %      Neutrophils Absolute 9.75 Thousands/µL      Immature Grans Absolute 0.05 Thousand/uL      Lymphocytes Absolute 1.12 Thousands/µL      Monocytes Absolute 0.83 Thousand/µL      Eosinophils Absolute 0.01 Thousand/µL      Basophils Absolute 0.04 Thousands/µL                  CT abdomen pelvis with contrast   Final Result by Allison Harper MD (10/02 1121)      2 mm left distal ureteral calculus just proximal to the UVJ with mild upstream hydronephroureterosis. No perinephric collection. 3 mm right renal nonobstructing calculus. Additional chronic findings and negatives as above. This study demonstrates a significant  finding and was documented as such in [facility] for liaison and referring practitioner notification. Workstation performed: [id number]                    Procedures  Procedures         ED Course  ED Course as of 10/03/23 1744   Mon Oct 02, 2023   1015 WBC(!): 11.80   1138 Updated patient on CT results. Pain well-controlled at this time. Patient will need repeat lactic acid. If pain controlled, plan for discharge with urology follow-up. SBIRT 22yo+    Flowsheet Row Most Recent Value   Initial Alcohol Screen: US AUDIT-C     1. How often do you have a drink containing alcohol? 0 Filed at: 10/02/2023 0920   2. How many drinks containing alcohol do you have on a typical day you are drinking? 0 Filed at: 10/02/2023 0920   3a. Male UNDER 65: How often do you have five or more drinks on one occasion? 0 Filed at: 10/02/2023 0920   3b. FEMALE Any Age, or MALE 65+: How often do you have 4 or more drinks on one occassion?  0 Filed at: 10/02/2023 0920   Audit-C Score 0 Filed at: 10/02/2023 2640   JENNY: How many times in the past year have you. .. Used an illegal drug or used a prescription medication for non-medical reasons? Never Filed at: 10/02/2023 0954                    Medical Decision Making  Patient is a 43-year-old male with a PMHx of BPH, HLD and HTN, presenting to the ED for evaluation of abdominal pain, constipation and difficulty urinating. Patient complaining of difficulty urinating on arrival; however, bladder scan showed only 27cc of urine. Patient's labs were reviewed and are notable for an elevated lactic acid but otherwise unremarkable. He is afebrile with no significant leukocytosis so low suspicion for sepsis. UA notable for microscopic hematuria without evidence of infection. CT showed a "2 mm left distal ureteral calculus just proximal to the UVJ with mild upstream hydronephroureterosis" which explains patient's symptoms. Discussed all results with patient in detail. He is currently pain free after Toradol and 0.5mg of Dilaudid. Lactic acid improved with IV fluids. He was monitored in the ED and had no return of pain. He is urinating without difficulty. He feels comfortable with discharge and outpatient urology follow-up. He is already on Flomax daily and was advised to continue this medication. He was encouraged to take tylenol/motrin as needed for pain and zofran as needed if nausea returns. He declines any prescription pain medications. Advised him to strain all of his urine and drink lots of fluids. He was instructed to return to the ED if he develops any fevers, chills, worsening pain, difficulty urinating or intractable vomiting. The management plan was discussed in detail with the patient at bedside and all questions were answered. Strict ED return instructions were discussed at bedside. Prior to discharge, both verbal and written instructions were provided. We discussed the signs and symptoms that should prompt the patient to return to the ED.  All questions were answered and the patient was comfortable with the plan of care and discharged home. The patient agrees to return to the Emergency Department for concerns and/or progression of illness. Amount and/or Complexity of Data Reviewed  Labs: ordered. Decision-making details documented in ED Course. Radiology: ordered. Risk  Prescription drug management. Disposition  Final diagnoses:   Left ureteral calculus   Abdominal pain   Right renal stone   Nausea & vomiting     Time reflects when diagnosis was documented in both MDM as applicable and the Disposition within this note     Time User Action Codes Description Comment    10/2/2023  1:46 PM Luly Tirado Add [N20.1] Left ureteral calculus     10/2/2023  1:46 PM Yasmin Russell Add [R10.9] Abdominal pain     10/2/2023  1:46 PM Yasmin Russell Add [N20.0] Right renal stone     10/2/2023  1:47 PM Yasmin Russell Add [R11.2] Nausea & vomiting       ED Disposition     ED Disposition   Discharge    Condition   Stable    Date/Time   Mon Oct 2, 2023  1:47 PM    Comment   Gabrielle Mendieta discharge to home/self care.                Follow-up Information     Follow up With Specialties Details Why Contact Info Additional Information    Rahul Robles MD Urology Schedule an appointment as soon as possible for a visit   [address] [facility] Emergency Department Emergency Medicine  If symptoms worsen [address] [address] [place] [phone]  [address] [place] [address] Emergency Department, [address]          Discharge Medication List as of 10/2/2023  1:48 PM      START taking these medications    Details   ondansetron (ZOFRAN-ODT) 4 mg disintegrating tablet Take 1 tablet (4 mg total) by mouth every 6 (six) hours as needed for nausea or vomiting, Starting Mon 10/2/2023, Normal         CONTINUE these medications which have NOT CHANGED    Details   lisinopril (ZESTRIL) 10 mg tablet Take 1 tablet (10 mg total) by mouth daily, Starting Fri 3/24/2023, Normal      metoprolol succinate (TOPROL-XL) 25 mg 24 hr tablet Take 1 tablet (25 mg total) by mouth daily, Starting Mon 1/9/2023, Normal      tamsulosin (FLOMAX) 0.4 mg Take 1 capsule (0.4 mg total) by mouth daily, Starting Thu 5/4/2023, Normal                 PDMP Review     None          ED Provider  Electronically Signed by           Linda Eugene PA-C  10/03/23 6512

## 2023-10-03 LAB
ATRIAL RATE: 85 BPM
P AXIS: 56 DEGREES
PR INTERVAL: 230 MS
QRS AXIS: 20 DEGREES
QRSD INTERVAL: 92 MS
QT INTERVAL: 366 MS
QTC INTERVAL: 435 MS
T WAVE AXIS: 46 DEGREES
VENTRICULAR RATE: 85 BPM

## 2023-10-03 PROCEDURE — 93010 ELECTROCARDIOGRAM REPORT: CPT | Performed by: INTERNAL MEDICINE

## 2023-10-30 DIAGNOSIS — Z12.5 ENCOUNTER FOR PROSTATE CANCER SCREENING: ICD-10-CM

## 2023-10-30 DIAGNOSIS — R73.09 ELEVATED GLUCOSE: ICD-10-CM

## 2023-10-30 DIAGNOSIS — Z13.220 SCREENING, LIPID: ICD-10-CM

## 2023-10-30 DIAGNOSIS — Z12.5 SCREENING PSA (PROSTATE SPECIFIC ANTIGEN): Primary | ICD-10-CM

## 2023-10-30 RX ORDER — TAMSULOSIN HYDROCHLORIDE 0.4 MG/1
0.4 CAPSULE ORAL DAILY
Qty: 90 CAPSULE | Refills: 1 | Status: SHIPPED | OUTPATIENT
Start: 2023-10-30

## 2023-10-30 NOTE — TELEPHONE ENCOUNTER
Pts wife called to schedule his AWV and asked if you can put through a full panel blood work with cholesterol also before his wellness visit in January so they can discuss results with you. Thank you.

## 2023-11-07 NOTE — TELEPHONE ENCOUNTER
Pt was here this morning and is requesting a lab test for a PSA as one was not ordered at the visit  Post-Care Instructions: I reviewed with the patient in detail post-care instructions. Patient is to keep the biopsy site dry overnight, and then apply bacitracin twice daily until healed. Patient may apply hydrogen peroxide soaks to remove any crusting.

## 2024-01-04 ENCOUNTER — APPOINTMENT (OUTPATIENT)
Dept: LAB | Facility: CLINIC | Age: 69
End: 2024-01-04
Payer: MEDICARE

## 2024-01-04 DIAGNOSIS — Z12.5 SCREENING PSA (PROSTATE SPECIFIC ANTIGEN): ICD-10-CM

## 2024-01-04 DIAGNOSIS — Z13.220 SCREENING, LIPID: ICD-10-CM

## 2024-01-04 DIAGNOSIS — R73.09 ELEVATED GLUCOSE: ICD-10-CM

## 2024-01-04 LAB
CHOLEST SERPL-MCNC: 194 MG/DL
EST. AVERAGE GLUCOSE BLD GHB EST-MCNC: 128 MG/DL
HBA1C MFR BLD: 6.1 %
HDLC SERPL-MCNC: 42 MG/DL
LDLC SERPL CALC-MCNC: 131 MG/DL (ref 0–100)
PSA SERPL-MCNC: 0.97 NG/ML (ref 0–4)
TRIGL SERPL-MCNC: 104 MG/DL

## 2024-01-04 PROCEDURE — G0103 PSA SCREENING: HCPCS

## 2024-01-04 PROCEDURE — 83036 HEMOGLOBIN GLYCOSYLATED A1C: CPT

## 2024-01-04 PROCEDURE — 36415 COLL VENOUS BLD VENIPUNCTURE: CPT

## 2024-01-04 PROCEDURE — 80061 LIPID PANEL: CPT

## 2024-01-10 ENCOUNTER — OFFICE VISIT (OUTPATIENT)
Dept: FAMILY MEDICINE CLINIC | Facility: CLINIC | Age: 69
End: 2024-01-10
Payer: MEDICARE

## 2024-01-10 VITALS
TEMPERATURE: 97.8 F | HEART RATE: 88 BPM | WEIGHT: 223.2 LBS | DIASTOLIC BLOOD PRESSURE: 84 MMHG | HEIGHT: 72 IN | SYSTOLIC BLOOD PRESSURE: 122 MMHG | BODY MASS INDEX: 30.23 KG/M2 | OXYGEN SATURATION: 97 %

## 2024-01-10 DIAGNOSIS — E78.00 ELEVATED LDL CHOLESTEROL LEVEL: ICD-10-CM

## 2024-01-10 DIAGNOSIS — R05.3 CHRONIC COUGH: ICD-10-CM

## 2024-01-10 DIAGNOSIS — E66.09 CLASS 1 OBESITY DUE TO EXCESS CALORIES WITH SERIOUS COMORBIDITY AND BODY MASS INDEX (BMI) OF 30.0 TO 30.9 IN ADULT: ICD-10-CM

## 2024-01-10 DIAGNOSIS — I10 ESSENTIAL HYPERTENSION: ICD-10-CM

## 2024-01-10 DIAGNOSIS — R73.03 PREDIABETES: Primary | ICD-10-CM

## 2024-01-10 DIAGNOSIS — Z00.00 MEDICARE ANNUAL WELLNESS VISIT, SUBSEQUENT: ICD-10-CM

## 2024-01-10 PROBLEM — Z12.5 ENCOUNTER FOR PROSTATE CANCER SCREENING: Status: RESOLVED | Noted: 2019-09-05 | Resolved: 2024-01-10

## 2024-01-10 PROBLEM — Z97.4 HEARING AID WORN: Status: ACTIVE | Noted: 2024-01-10

## 2024-01-10 PROCEDURE — 99214 OFFICE O/P EST MOD 30 MIN: CPT | Performed by: FAMILY MEDICINE

## 2024-01-10 PROCEDURE — G0439 PPPS, SUBSEQ VISIT: HCPCS | Performed by: FAMILY MEDICINE

## 2024-01-10 RX ORDER — METOPROLOL SUCCINATE 25 MG/1
25 TABLET, EXTENDED RELEASE ORAL DAILY
Qty: 90 TABLET | Refills: 3 | Status: SHIPPED | OUTPATIENT
Start: 2024-01-10

## 2024-01-10 RX ORDER — DIPHENOXYLATE HYDROCHLORIDE AND ATROPINE SULFATE 2.5; .025 MG/1; MG/1
1 TABLET ORAL DAILY
COMMUNITY

## 2024-01-10 RX ORDER — LOSARTAN POTASSIUM 50 MG/1
50 TABLET ORAL DAILY
Qty: 90 TABLET | Refills: 1 | Status: SHIPPED | OUTPATIENT
Start: 2024-01-10

## 2024-01-10 NOTE — PATIENT INSTRUCTIONS
Medicare Preventive Visit Patient Instructions  Thank you for completing your Welcome to Medicare Visit or Medicare Annual Wellness Visit today. Your next wellness visit will be due in one year (1/10/2025).  The screening/preventive services that you may require over the next 5-10 years are detailed below. Some tests may not apply to you based off risk factors and/or age. Screening tests ordered at today's visit but not completed yet may show as past due. Also, please note that scanned in results may not display below.  Preventive Screenings:  Service Recommendations Previous Testing/Comments   Colorectal Cancer Screening  Colonoscopy    Fecal Occult Blood Test (FOBT)/Fecal Immunochemical Test (FIT)  Fecal DNA/Cologuard Test  Flexible Sigmoidoscopy Age: 45-75 years old   Colonoscopy: every 10 years (May be performed more frequently if at higher risk)  OR  FOBT/FIT: every 1 year  OR  Cologuard: every 3 years  OR  Sigmoidoscopy: every 5 years  Screening may be recommended earlier than age 45 if at higher risk for colorectal cancer. Also, an individualized decision between you and your healthcare provider will decide whether screening between the ages of 76-85 would be appropriate. Colonoscopy: 07/11/2022  FOBT/FIT: 07/11/2022  Cologuard: 07/11/2022  Sigmoidoscopy: 07/11/2022    Screening Current     Prostate Cancer Screening Individualized decision between patient and health care provider in men between ages of 55-69   Medicare will cover every 12 months beginning on the day after your 50th birthday PSA: 0.97 ng/mL     Screening Current     Hepatitis C Screening Once for adults born between 1945 and 1965  More frequently in patients at high risk for Hepatitis C Hep C Antibody: 12/27/2022    Screening Current   Diabetes Screening 1-2 times per year if you're at risk for diabetes or have pre-diabetes Fasting glucose: 93 mg/dL (12/27/2022)  A1C: 6.1 % (1/4/2024)  Screening Current   Cholesterol Screening Once every 5  years if you don't have a lipid disorder. May order more often based on risk factors. Lipid panel: 01/04/2024  Screening Current      Other Preventive Screenings Covered by Medicare:  Abdominal Aortic Aneurysm (AAA) Screening: covered once if your at risk. You're considered to be at risk if you have a family history of AAA or a male between the age of 65-75 who smoking at least 100 cigarettes in your lifetime.  Lung Cancer Screening: covers low dose CT scan once per year if you meet all of the following conditions: (1) Age 55-77; (2) No signs or symptoms of lung cancer; (3) Current smoker or have quit smoking within the last 15 years; (4) You have a tobacco smoking history of at least 20 pack years (packs per day x number of years you smoked); (5) You get a written order from a healthcare provider.  Glaucoma Screening: covered annually if you're considered high risk: (1) You have diabetes OR (2) Family history of glaucoma OR (3)  aged 50 and older OR (4)  American aged 65 and older  Osteoporosis Screening: covered every 2 years if you meet one of the following conditions: (1) Have a vertebral abnormality; (2) On glucocorticoid therapy for more than 3 months; (3) Have primary hyperparathyroidism; (4) On osteoporosis medications and need to assess response to drug therapy.  HIV Screening: covered annually if you're between the age of 15-65. Also covered annually if you are younger than 15 and older than 65 with risk factors for HIV infection. For pregnant patients, it is covered up to 3 times per pregnancy.    Immunizations:  Immunization Recommendations   Influenza Vaccine Annual influenza vaccination during flu season is recommended for all persons aged >= 6 months who do not have contraindications   Pneumococcal Vaccine   * Pneumococcal conjugate vaccine = PCV13 (Prevnar 13), PCV15 (Vaxneuvance), PCV20 (Prevnar 20)  * Pneumococcal polysaccharide vaccine = PPSV23 (Pneumovax) Adults 19-63 yo  with certain risk factors or if 65+ yo  If never received any pneumonia vaccine: recommend Prevnar 20 (PCV20)  Give PCV20 if previously received 1 dose of PCV13 or PPSV23   Hepatitis B Vaccine 3 dose series if at intermediate or high risk (ex: diabetes, end stage renal disease, liver disease)   Respiratory syncytial virus (RSV) Vaccine - COVERED BY MEDICARE PART D  * RSVPreF3 (Arexvy) CDC recommends that adults 60 years of age and older may receive a single dose of RSV vaccine using shared clinical decision-making (SCDM)   Tetanus (Td) Vaccine - COST NOT COVERED BY MEDICARE PART B Following completion of primary series, a booster dose should be given every 10 years to maintain immunity against tetanus. Td may also be given as tetanus wound prophylaxis.   Tdap Vaccine - COST NOT COVERED BY MEDICARE PART B Recommended at least once for all adults. For pregnant patients, recommended with each pregnancy.   Shingles Vaccine (Shingrix) - COST NOT COVERED BY MEDICARE PART B  2 shot series recommended in those 19 years and older who have or will have weakened immune systems or those 50 years and older     Health Maintenance Due:      Topic Date Due   • Colorectal Cancer Screening  07/08/2032   • Hepatitis C Screening  Completed     Immunizations Due:      Topic Date Due   • COVID-19 Vaccine (2 - 2023-24 season) 09/01/2023     Advance Directives   What are advance directives?  Advance directives are legal documents that state your wishes and plans for medical care. These plans are made ahead of time in case you lose your ability to make decisions for yourself. Advance directives can apply to any medical decision, such as the treatments you want, and if you want to donate organs.   What are the types of advance directives?  There are many types of advance directives, and each state has rules about how to use them. You may choose a combination of any of the following:  Living will:  This is a written record of the treatment  you want. You can also choose which treatments you do not want, which to limit, and which to stop at a certain time. This includes surgery, medicine, IV fluid, and tube feedings.   Durable power of  for healthcare (DPAHC):  This is a written record that states who you want to make healthcare choices for you when you are unable to make them for yourself. This person, called a proxy, is usually a family member or a friend. You may choose more than 1 proxy.  Do not resuscitate (DNR) order:  A DNR order is used in case your heart stops beating or you stop breathing. It is a request not to have certain forms of treatment, such as CPR. A DNR order may be included in other types of advance directives.  Medical directive:  This covers the care that you want if you are in a coma, near death, or unable to make decisions for yourself. You can list the treatments you want for each condition. Treatment may include pain medicine, surgery, blood transfusions, dialysis, IV or tube feedings, and a ventilator (breathing machine).  Values history:  This document has questions about your views, beliefs, and how you feel and think about life. This information can help others choose the care that you would choose.  Why are advance directives important?  An advance directive helps you control your care. Although spoken wishes may be used, it is better to have your wishes written down. Spoken wishes can be misunderstood, or not followed. Treatments may be given even if you do not want them. An advance directive may make it easier for your family to make difficult choices about your care.   Weight Management   Why it is important to manage your weight:  Being overweight increases your risk of health conditions such as heart disease, high blood pressure, type 2 diabetes, and certain types of cancer. It can also increase your risk for osteoarthritis, sleep apnea, and other respiratory problems. Aim for a slow, steady weight loss. Even  a small amount of weight loss can lower your risk of health problems.  How to lose weight safely:  A safe and healthy way to lose weight is to eat fewer calories and get regular exercise. You can lose up about 1 pound a week by decreasing the number of calories you eat by 500 calories each day.   Healthy meal plan for weight management:  A healthy meal plan includes a variety of foods, contains fewer calories, and helps you stay healthy. A healthy meal plan includes the following:  Eat whole-grain foods more often.  A healthy meal plan should contain fiber. Fiber is the part of grains, fruits, and vegetables that is not broken down by your body. Whole-grain foods are healthy and provide extra fiber in your diet. Some examples of whole-grain foods are whole-wheat breads and pastas, oatmeal, brown rice, and bulgur.  Eat a variety of vegetables every day.  Include dark, leafy greens such as spinach, kale, erll greens, and mustard greens. Eat yellow and orange vegetables such as carrots, sweet potatoes, and winter squash.   Eat a variety of fruits every day.  Choose fresh or canned fruit (canned in its own juice or light syrup) instead of juice. Fruit juice has very little or no fiber.  Eat low-fat dairy foods.  Drink fat-free (skim) milk or 1% milk. Eat fat-free yogurt and low-fat cottage cheese. Try low-fat cheeses such as mozzarella and other reduced-fat cheeses.  Choose meat and other protein foods that are low in fat.  Choose beans or other legumes such as split peas or lentils. Choose fish, skinless poultry (chicken or turkey), or lean cuts of red meat (beef or pork). Before you cook meat or poultry, cut off any visible fat.   Use less fat and oil.  Try baking foods instead of frying them. Add less fat, such as margarine, sour cream, regular salad dressing and mayonnaise to foods. Eat fewer high-fat foods. Some examples of high-fat foods include french fries, doughnuts, ice cream, and cakes.  Eat fewer sweets.   Limit foods and drinks that are high in sugar. This includes candy, cookies, regular soda, and sweetened drinks.  Exercise:  Exercise at least 30 minutes per day on most days of the week. Some examples of exercise include walking, biking, dancing, and swimming. You can also fit in more physical activity by taking the stairs instead of the elevator or parking farther away from stores. Ask your healthcare provider about the best exercise plan for you.      © Copyright [employer] 2018 Information is for End User's use only and may not be sold, redistributed or otherwise used for commercial purposes. All illustrations and images included in CareNotes® are the copyrighted property of [employer].A[employer]M., Inc. or [employer]

## 2024-01-10 NOTE — PROGRESS NOTES
Assessment and Plan:     Problem List Items Addressed This Visit        Cardiovascular and Mediastinum    Essential hypertension     Will switch to Losartan 50 mg (for cough possibly related to ace), monitor blood pressure and call if any issues         Relevant Medications    losartan (COZAAR) 50 mg tablet       Other    Class 1 obesity due to excess calories with serious comorbidity and body mass index (BMI) of 30.0 to 30.9 in adult    Prediabetes - Primary     Low carb diet, weight loss, exercise. Advised to repeat in 6 months         Relevant Orders    Hemoglobin A1C    Chronic cough     Suspect from chronic PND, possibly allergies.  He inquired about switching his ace inhibitor, so will change to ARB and see if cough improves. If not, consider CXR, PFT, allergy testing, ENT referral        Other Visit Diagnoses     Elevated LDL cholesterol level        Relevant Orders    Lipid Panel with Direct LDL reflex    Medicare annual wellness visit, subsequent              Depression Screening and Follow-up Plan: Patient was screened for depression during today's encounter. They screened negative with a PHQ-2 score of 0.      Preventive health issues were discussed with patient, and age appropriate screening tests were ordered as noted in patient's After Visit Summary.  Personalized health advice and appropriate referrals for health education or preventive services given if needed, as noted in patient's After Visit Summary.     History of Present Illness:     Patient presents for a Medicare Wellness Visit    Here for a check up. Had labs done.   PSA normal  A1c 6.1%, prediabetic range, this has increased. Diet could be better. BMI 30  Lipid -LDL slightly above goal, not on meds  HTN - controlled on Lisinopril.    He has been coughing for years, sounds like since he was in his 20s. His daughter who is a physician asked about switching him from Lisinopril to Losartan.  He does describe some PND.  Denies GERD. States he  had allergy testing years ago. He has never smoked         Patient Care Team:  Amanda Novak MD as PCP - General (Family Medicine)     Review of Systems:     Review of Systems   Constitutional:  Negative for chills, fatigue and fever.   HENT:  Positive for postnasal drip. Negative for congestion.    Eyes: Negative.    Respiratory:  Positive for cough. Negative for apnea, choking, chest tightness, shortness of breath, wheezing and stridor.    Cardiovascular: Negative.    Gastrointestinal: Negative.    Endocrine: Negative.    Genitourinary: Negative.    Musculoskeletal: Negative.    Allergic/Immunologic: Negative.    Neurological: Negative.    Hematological: Negative.    Psychiatric/Behavioral: Negative.          Problem List:     Patient Active Problem List   Diagnosis   • Benign prostatic hyperplasia without lower urinary tract symptoms   • Hearing loss   • Essential hypertension   • Class 1 obesity due to excess calories with serious comorbidity and body mass index (BMI) of 30.0 to 30.9 in adult   • Prediabetes   • Hearing aid worn   • Chronic cough      Past Medical and Surgical History:     Past Medical History:   Diagnosis Date   • Benign renovascular hypertension    • Colon polyp    • Hyperlipidemia      Past Surgical History:   Procedure Laterality Date   • COLONOSCOPY  2007    Normal       Family History:     Family History   Problem Relation Age of Onset   • Pancreatic cancer Mother    • Heart disease Father       Social History:     Social History     Socioeconomic History   • Marital status: /Civil Union     Spouse name: None   • Number of children: None   • Years of education: None   • Highest education level: None   Occupational History   • Occupation: Teacher    Tobacco Use   • Smoking status: Never   • Smokeless tobacco: Never   Vaping Use   • Vaping status: Never Used   Substance and Sexual Activity   • Alcohol use: Never   • Drug use: Never   • Sexual activity: None   Other Topics Concern    • None   Social History Narrative   • None     Social Determinants of Health     Financial Resource Strain: Patient Declined (1/10/2024)    Overall Financial Resource Strain (CARDIA)    • Difficulty of Paying Living Expenses: Patient declined   Food Insecurity: Not on file   Transportation Needs: No Transportation Needs (1/10/2024)    PRAPARE - Transportation    • Lack of Transportation (Medical): No    • Lack of Transportation (Non-Medical): No   Physical Activity: Not on file   Stress: Not on file   Social Connections: Not on file   Intimate Partner Violence: Not on file   Housing Stability: Not on file      Medications and Allergies:     Current Outpatient Medications   Medication Sig Dispense Refill   • losartan (COZAAR) 50 mg tablet Take 1 tablet (50 mg total) by mouth daily 90 tablet 1   • metoprolol succinate (TOPROL-XL) 25 mg 24 hr tablet Take 1 tablet (25 mg total) by mouth daily 90 tablet 3   • multivitamin (THERAGRAN) TABS Take 1 tablet by mouth daily     • tamsulosin (FLOMAX) 0.4 mg Take 1 capsule (0.4 mg total) by mouth daily 90 capsule 1     No current facility-administered medications for this visit.     No Known Allergies   Immunizations:     Immunization History   Administered Date(s) Administered   • COVID-19 MODERNA VACC 0.5 ML IM 03/16/2021   • INFLUENZA 10/04/2021, 11/16/2022, 11/07/2023   • Influenza, recombinant, quadrivalent,injectable, preservative free 10/26/2020      Health Maintenance:         Topic Date Due   • Colorectal Cancer Screening  07/08/2032   • Hepatitis C Screening  Completed         Topic Date Due   • COVID-19 Vaccine (2 - 2023-24 season) 09/01/2023      Medicare Screening Tests and Risk Assessments:     Ebenezer is here for his Subsequent Wellness visit. Last Medicare Wellness visit information reviewed, patient interviewed and updates made to the record today.      Health Risk Assessment:   Patient rates overall health as excellent. Patient feels that their physical health  rating is same. Patient is very satisfied with their life. Eyesight was rated as same. Hearing was rated as same. Patient feels that their emotional and mental health rating is same. Patients states they are never, rarely angry. Patient states they are never, rarely unusually tired/fatigued. Pain experienced in the last 7 days has been none. Patient states that he has experienced no weight loss or gain in last 6 months.     Depression Screening:   PHQ-2 Score: 0      Fall Risk Screening:   In the past year, patient has experienced: no history of falling in past year      Home Safety:  Patient does not have trouble with stairs inside or outside of their home. Patient has working smoke alarms and has working carbon monoxide detector. Home safety hazards include: none.     Nutrition:   Current diet is Regular.     Medications:   Patient is not currently taking any over-the-counter supplements. Patient is able to manage medications.     Activities of Daily Living (ADLs)/Instrumental Activities of Daily Living (IADLs):   Walk and transfer into and out of bed and chair?: Yes  Dress and groom yourself?: Yes    Bathe or shower yourself?: Yes    Feed yourself? Yes  Do your laundry/housekeeping?: Yes  Manage your money, pay your bills and track your expenses?: Yes  Make your own meals?: Yes    Do your own shopping?: Yes    Previous Hospitalizations:   Any hospitalizations or ED visits within the last 12 months?: Yes    How many hospitalizations have you had in the last year?: 1-2    Advance Care Planning:   Living will: No    Durable POA for healthcare: No    Advanced directive: No    ACP document given: Yes      Cognitive Screening:   Provider or family/friend/caregiver concerned regarding cognition?: No    PREVENTIVE SCREENINGS      Cardiovascular Screening:    General: Screening Current and Risks and Benefits Discussed      Diabetes Screening:     General: Screening Current and Risks and Benefits Discussed      Colorectal  Cancer Screening:     General: Screening Current      Prostate Cancer Screening:    General: Screening Current and Risks and Benefits Discussed      Osteoporosis Screening:    General: Screening Not Indicated      Abdominal Aortic Aneurysm (AAA) Screening:    Risk factors include: age between 65-74 yo        General: Screening Not Indicated      Lung Cancer Screening:     General: Screening Not Indicated      Hepatitis C Screening:    General: Screening Current    Screening, Brief Intervention, and Referral to Treatment (SBIRT)    Screening    Typical number of drinks in a week: 2    Single Item Drug Screening:  How often have you used an illegal drug (including marijuana) or a prescription medication for non-medical reasons in the past year? never    Single Item Drug Screen Score: 0  Interpretation: Negative screen for possible drug use disorder    Brief Intervention  Alcohol & drug use screenings were reviewed. No concerns regarding substance use disorder identified.     Other Counseling Topics:   Regular weightbearing exercise.     No results found.     Physical Exam:     /84 (BP Location: Left arm, Patient Position: Sitting)   Pulse 88   Temp 97.8 °F (36.6 °C) (Temporal)   Ht 6' (1.829 m)   Wt 101 kg (223 lb 3.2 oz)   SpO2 97%   BMI 30.27 kg/m²     Physical Exam  Vitals and nursing note reviewed.   Constitutional:       General: He is not in acute distress.     Appearance: He is well-developed. He is obese. He is not diaphoretic.   HENT:      Head: Normocephalic and atraumatic.      Right Ear: External ear normal.      Left Ear: External ear normal.      Ears:      Comments: Hearing aids     Mouth/Throat:      Comments: mask  Eyes:      Conjunctiva/sclera: Conjunctivae normal.   Neck:      Vascular: No carotid bruit.   Cardiovascular:      Rate and Rhythm: Normal rate and regular rhythm.      Heart sounds: Normal heart sounds. No murmur heard.     No friction rub. No gallop.   Pulmonary:      Effort:  Pulmonary effort is normal. No respiratory distress.      Breath sounds: Normal breath sounds. No stridor. No wheezing or rales.   Chest:      Chest wall: No tenderness.   Musculoskeletal:         General: No swelling.      Right lower leg: No edema.      Left lower leg: No edema.   Skin:     Findings: No rash.   Neurological:      General: No focal deficit present.      Mental Status: He is alert. Mental status is at baseline.   Psychiatric:         Mood and Affect: Mood normal.          Amanda Novak MD

## 2024-01-10 NOTE — ASSESSMENT & PLAN NOTE
Suspect from chronic PND, possibly allergies.  He inquired about switching his ace inhibitor, so will change to ARB and see if cough improves. If not, consider CXR, PFT, allergy testing, ENT referral

## 2024-05-01 DIAGNOSIS — Z12.5 ENCOUNTER FOR PROSTATE CANCER SCREENING: ICD-10-CM

## 2024-05-01 NOTE — TELEPHONE ENCOUNTER
Reason for call:   [x] Refill   [] Prior Auth  [] Other:     Office:   [x] PCP/Provider -   Amanda Novak MD  PCP - General  [] Specialty/Provider -     Medication: tamsulosin (FLOMAX) 0.4 mg - one tab daily # 90      Pharmacy: [id number] - [place], [place] - [address]      Does the patient have enough for 3 days?   [x] Yes   [] No - Send as HP to POD

## 2024-05-02 RX ORDER — TAMSULOSIN HYDROCHLORIDE 0.4 MG/1
0.4 CAPSULE ORAL DAILY
Qty: 90 CAPSULE | Refills: 1 | Status: SHIPPED | OUTPATIENT
Start: 2024-05-02

## 2024-09-16 DIAGNOSIS — I10 ESSENTIAL HYPERTENSION: ICD-10-CM

## 2024-09-16 NOTE — TELEPHONE ENCOUNTER
Reason for call:   [x] Refill   [] Prior Auth  [] Other:     Office:   [x] PCP/Provider - [facility]   [] Specialty/Provider -     Medication:   losartan (COZAAR) 50 mg tablet - Take 1 tablet (50 mg total) by mouth daily     Pharmacy:   [id number] - [place] - [address]     Does the patient have enough for 3 days?   [x] Yes   [] No - Send as HP to POD

## 2024-09-17 RX ORDER — LOSARTAN POTASSIUM 50 MG/1
50 TABLET ORAL DAILY
Qty: 90 TABLET | Refills: 1 | Status: SHIPPED | OUTPATIENT
Start: 2024-09-17

## 2024-10-24 DIAGNOSIS — Z12.5 ENCOUNTER FOR PROSTATE CANCER SCREENING: ICD-10-CM

## 2024-10-24 RX ORDER — TAMSULOSIN HYDROCHLORIDE 0.4 MG/1
0.4 CAPSULE ORAL DAILY
Qty: 90 CAPSULE | Refills: 0 | Status: SHIPPED | OUTPATIENT
Start: 2024-10-24

## 2024-10-24 NOTE — TELEPHONE ENCOUNTER
Reason for call:   [x] Refill   [] Prior Auth  [] Other:     Office:   [x] PCP/Provider - [facility] - Amanda Novak MD   [] Specialty/Provider -     Medication: tamsulosin (FLOMAX) 0.4 mg     Dose/Frequency:  Take 1 capsule (0.4 mg total) by mouth daily     Quantity: 90 capsule     Pharmacy: [id number] - [place] - [address] [phone]    Does the patient have enough for 3 days?   [x] Yes   [] No - Send as HP to POD

## 2024-12-19 DIAGNOSIS — I10 ESSENTIAL HYPERTENSION: ICD-10-CM

## 2024-12-19 DIAGNOSIS — R73.03 PREDIABETES: Primary | ICD-10-CM

## 2024-12-19 DIAGNOSIS — E78.00 ELEVATED LDL CHOLESTEROL LEVEL: ICD-10-CM

## 2024-12-19 DIAGNOSIS — Z12.5 SCREENING PSA (PROSTATE SPECIFIC ANTIGEN): ICD-10-CM

## 2024-12-19 RX ORDER — METOPROLOL SUCCINATE 25 MG/1
25 TABLET, EXTENDED RELEASE ORAL DAILY
Qty: 90 TABLET | Refills: 3 | Status: SHIPPED | OUTPATIENT
Start: 2024-12-19

## 2024-12-19 NOTE — TELEPHONE ENCOUNTER
Patient has an upcoming appointment with Dr. Novak.  He is asking for her to add a PSA to the lab orders.  Thank you.

## 2025-01-08 ENCOUNTER — APPOINTMENT (OUTPATIENT)
Dept: LAB | Facility: CLINIC | Age: 70
End: 2025-01-08
Payer: MEDICARE

## 2025-01-08 DIAGNOSIS — E78.00 ELEVATED LDL CHOLESTEROL LEVEL: ICD-10-CM

## 2025-01-08 DIAGNOSIS — I10 ESSENTIAL HYPERTENSION: ICD-10-CM

## 2025-01-08 DIAGNOSIS — R73.03 PREDIABETES: ICD-10-CM

## 2025-01-08 DIAGNOSIS — Z12.5 SCREENING PSA (PROSTATE SPECIFIC ANTIGEN): ICD-10-CM

## 2025-01-08 LAB
ALBUMIN SERPL BCG-MCNC: 4.2 G/DL (ref 3.5–5)
ALP SERPL-CCNC: 39 U/L (ref 34–104)
ALT SERPL W P-5'-P-CCNC: 14 U/L (ref 7–52)
ANION GAP SERPL CALCULATED.3IONS-SCNC: 7 MMOL/L (ref 4–13)
AST SERPL W P-5'-P-CCNC: 16 U/L (ref 13–39)
BASOPHILS # BLD AUTO: 0.03 THOUSANDS/ΜL (ref 0–0.1)
BASOPHILS NFR BLD AUTO: 1 % (ref 0–1)
BILIRUB SERPL-MCNC: 0.66 MG/DL (ref 0.2–1)
BUN SERPL-MCNC: 16 MG/DL (ref 5–25)
CALCIUM SERPL-MCNC: 9.2 MG/DL (ref 8.4–10.2)
CHLORIDE SERPL-SCNC: 103 MMOL/L (ref 96–108)
CHOLEST SERPL-MCNC: 191 MG/DL (ref ?–200)
CO2 SERPL-SCNC: 30 MMOL/L (ref 21–32)
CREAT SERPL-MCNC: 0.94 MG/DL (ref 0.6–1.3)
EOSINOPHIL # BLD AUTO: 0.02 THOUSAND/ΜL (ref 0–0.61)
EOSINOPHIL NFR BLD AUTO: 0 % (ref 0–6)
ERYTHROCYTE [DISTWIDTH] IN BLOOD BY AUTOMATED COUNT: 12.6 % (ref 11.6–15.1)
GFR SERPL CREATININE-BSD FRML MDRD: 82 ML/MIN/1.73SQ M
GLUCOSE P FAST SERPL-MCNC: 93 MG/DL (ref 65–99)
HCT VFR BLD AUTO: 48.6 % (ref 36.5–49.3)
HDLC SERPL-MCNC: 43 MG/DL
HGB BLD-MCNC: 15.8 G/DL (ref 12–17)
IMM GRANULOCYTES # BLD AUTO: 0.01 THOUSAND/UL (ref 0–0.2)
IMM GRANULOCYTES NFR BLD AUTO: 0 % (ref 0–2)
LDLC SERPL CALC-MCNC: 126 MG/DL (ref 0–100)
LYMPHOCYTES # BLD AUTO: 1.62 THOUSANDS/ΜL (ref 0.6–4.47)
LYMPHOCYTES NFR BLD AUTO: 32 % (ref 14–44)
MCH RBC QN AUTO: 31.8 PG (ref 26.8–34.3)
MCHC RBC AUTO-ENTMCNC: 32.5 G/DL (ref 31.4–37.4)
MCV RBC AUTO: 98 FL (ref 82–98)
MONOCYTES # BLD AUTO: 0.62 THOUSAND/ΜL (ref 0.17–1.22)
MONOCYTES NFR BLD AUTO: 12 % (ref 4–12)
NEUTROPHILS # BLD AUTO: 2.84 THOUSANDS/ΜL (ref 1.85–7.62)
NEUTS SEG NFR BLD AUTO: 55 % (ref 43–75)
NRBC BLD AUTO-RTO: 0 /100 WBCS
PLATELET # BLD AUTO: 213 THOUSANDS/UL (ref 149–390)
PMV BLD AUTO: 10.7 FL (ref 8.9–12.7)
POTASSIUM SERPL-SCNC: 4.6 MMOL/L (ref 3.5–5.3)
PROT SERPL-MCNC: 7.1 G/DL (ref 6.4–8.4)
PSA SERPL-MCNC: 1.33 NG/ML (ref 0–4)
RBC # BLD AUTO: 4.97 MILLION/UL (ref 3.88–5.62)
SODIUM SERPL-SCNC: 140 MMOL/L (ref 135–147)
TRIGL SERPL-MCNC: 108 MG/DL (ref ?–150)
WBC # BLD AUTO: 5.14 THOUSAND/UL (ref 4.31–10.16)

## 2025-01-08 PROCEDURE — 36415 COLL VENOUS BLD VENIPUNCTURE: CPT

## 2025-01-08 PROCEDURE — G0103 PSA SCREENING: HCPCS

## 2025-01-08 PROCEDURE — 83036 HEMOGLOBIN GLYCOSYLATED A1C: CPT

## 2025-01-08 PROCEDURE — 80061 LIPID PANEL: CPT

## 2025-01-08 PROCEDURE — 80053 COMPREHEN METABOLIC PANEL: CPT

## 2025-01-08 PROCEDURE — 85025 COMPLETE CBC W/AUTO DIFF WBC: CPT

## 2025-01-09 ENCOUNTER — RESULTS FOLLOW-UP (OUTPATIENT)
Dept: FAMILY MEDICINE CLINIC | Facility: CLINIC | Age: 70
End: 2025-01-09

## 2025-01-09 LAB
EST. AVERAGE GLUCOSE BLD GHB EST-MCNC: 120 MG/DL
HBA1C MFR BLD: 5.8 %

## 2025-01-13 ENCOUNTER — OFFICE VISIT (OUTPATIENT)
Dept: FAMILY MEDICINE CLINIC | Facility: CLINIC | Age: 70
End: 2025-01-13
Payer: MEDICARE

## 2025-01-13 VITALS
HEART RATE: 82 BPM | HEIGHT: 73 IN | DIASTOLIC BLOOD PRESSURE: 72 MMHG | SYSTOLIC BLOOD PRESSURE: 112 MMHG | BODY MASS INDEX: 30.51 KG/M2 | OXYGEN SATURATION: 90 % | WEIGHT: 230.2 LBS | TEMPERATURE: 98.4 F

## 2025-01-13 DIAGNOSIS — Z12.5 SCREENING PSA (PROSTATE SPECIFIC ANTIGEN): ICD-10-CM

## 2025-01-13 DIAGNOSIS — I10 ESSENTIAL HYPERTENSION: ICD-10-CM

## 2025-01-13 DIAGNOSIS — E78.00 ELEVATED LDL CHOLESTEROL LEVEL: ICD-10-CM

## 2025-01-13 DIAGNOSIS — Z00.00 MEDICARE ANNUAL WELLNESS VISIT, SUBSEQUENT: Primary | ICD-10-CM

## 2025-01-13 DIAGNOSIS — R73.03 PREDIABETES: ICD-10-CM

## 2025-01-13 PROCEDURE — G0439 PPPS, SUBSEQ VISIT: HCPCS | Performed by: FAMILY MEDICINE

## 2025-01-13 NOTE — PROGRESS NOTES
Name: Ebenezer Lopez      : 1955      MRN: [id number]  Encounter Provider: Amanda Novak MD  Encounter Date: 2025   Encounter department: [facility]    Assessment & Plan  Medicare annual wellness visit, subsequent         Prediabetes    Orders:  •  Comprehensive metabolic panel; Future  •  Hemoglobin A1C; Future    Screening PSA (prostate specific antigen)    Orders:  •  PSA, Total Screen; Future    Elevated LDL cholesterol level    Orders:  •  Lipid Panel with Direct LDL reflex; Future    Essential hypertension    Orders:  •  CBC and differential; Future       Preventive health issues were discussed with patient, and age appropriate screening tests were ordered as noted in patient's After Visit Summary. Personalized health advice and appropriate referrals for health education or preventive services given if needed, as noted in patient's After Visit Summary.    Advised to get Shingrix, Tdap updated.    Repeat labs in 1 year     History of Present Illness     He had labs done which we reviewed. A1c 5.8%, down from 6.1%. otherwise labs within normal limits.  No acute complaints.        Patient Care Team:  Amanda Novak MD as PCP - General (Family Medicine)    Review of Systems   Constitutional:  Negative for activity change, appetite change, fatigue and unexpected weight change.   Respiratory:  Negative for chest tightness and shortness of breath.    Cardiovascular:  Negative for chest pain and leg swelling.   Gastrointestinal:  Negative for abdominal pain.   Genitourinary:  Positive for frequency.   Neurological:  Negative for headaches.     Medical History Reviewed by provider this encounter:  [place]       Annual Wellness Visit Questionnaire   Ebenezer is here for his Subsequent Wellness visit. Last Medicare Wellness visit information reviewed, patient interviewed and updates made to the record today.      Health Risk Assessment:   Patient rates overall health as excellent.  Patient feels that their physical health rating is same. Patient is very satisfied with their life. Eyesight was rated as slightly better. Hearing was rated as same. Patient feels that their emotional and mental health rating is same. Patients states they are never, rarely angry. Patient states they are never, rarely unusually tired/fatigued. Pain experienced in the last 7 days has been none. Patient states that he has experienced no weight loss or gain in last 6 months.     Depression Screening:   PHQ-2 Score: 0      Fall Risk Screening:   In the past year, patient has experienced: no history of falling in past year      Home Safety:  Patient does not have trouble with stairs inside or outside of their home. Patient has working smoke alarms and has working carbon monoxide detector. Home safety hazards include: none.     Nutrition:   Current diet is Regular.     Medications:   Patient is not currently taking any over-the-counter supplements. Patient is able to manage medications.     Activities of Daily Living (ADLs)/Instrumental Activities of Daily Living (IADLs):   Walk and transfer into and out of bed and chair?: Yes  Dress and groom yourself?: Yes    Bathe or shower yourself?: Yes    Feed yourself? Yes  Do your laundry/housekeeping?: Yes  Manage your money, pay your bills and track your expenses?: Yes  Make your own meals?: Yes    Do your own shopping?: Yes    Previous Hospitalizations:   Any hospitalizations or ED visits within the last 12 months?: No    How many hospitalizations have you had in the last year?: 1-2    Advance Care Planning:   Living will: No    Durable POA for healthcare: No    Advanced directive: No    ACP document given: Yes      Cognitive Screening:   Provider or family/friend/caregiver concerned regarding cognition?: No    PREVENTIVE SCREENINGS      Cardiovascular Screening:    General: Screening Current and Risks and Benefits Discussed      Diabetes Screening:     General: Screening  Current and Risks and Benefits Discussed      Colorectal Cancer Screening:     General: Screening Current      Prostate Cancer Screening:    General: Screening Current and Risks and Benefits Discussed      Osteoporosis Screening:    General: Screening Not Indicated      Abdominal Aortic Aneurysm (AAA) Screening:    Risk factors include: age between 65-76 yo        General: Screening Not Indicated      Lung Cancer Screening:     General: Screening Not Indicated      Hepatitis C Screening:    General: Screening Current    Screening, Brief Intervention, and Referral to Treatment (SBIRT)    Screening  Typical number of drinks in a day: 0  Typical number of drinks in a week: 0  Interpretation: Low risk drinking behavior.    Single Item Drug Screening:  How often have you used an illegal drug (including marijuana) or a prescription medication for non-medical reasons in the past year? never    Single Item Drug Screen Score: 0  Interpretation: Negative screen for possible drug use disorder    Brief Intervention  Alcohol & drug use screenings were reviewed. No concerns regarding substance use disorder identified.     Other Counseling Topics:   Regular weightbearing exercise.     Social Drivers of Health     Financial Resource Strain: Patient Declined (1/10/2024)    Overall Financial Resource Strain (CARDIA)    • Difficulty of Paying Living Expenses: Patient declined   Food Insecurity: No Food Insecurity (1/13/2025)    Hunger Vital Sign    • Worried About Running Out of Food in the Last Year: Never true    • Ran Out of Food in the Last Year: Never true   Transportation Needs: No Transportation Needs (1/13/2025)    PRAPARE - Transportation    • Lack of Transportation (Medical): No    • Lack of Transportation (Non-Medical): No   Housing Stability: Low Risk  (1/13/2025)    Housing Stability Vital Sign    • Unable to Pay for Housing in the Last Year: No    • Number of Times Moved in the Last Year: 0    • Homeless in the Last  "Year: No   Utilities: Not At Risk (1/13/2025)    [facility] Utilities    • Threatened with loss of utilities: No     No results found.    Objective   /72   Pulse 82   Temp 98.4 °F (36.9 °C)   Ht 6' 1\" (1.854 m)   Wt 104 kg (230 lb 3.2 oz)   SpO2 90%   BMI 30.37 kg/m²     Physical Exam  Vitals and nursing note reviewed.   Constitutional:       General: He is not in acute distress.     Appearance: He is well-developed. He is not diaphoretic.   HENT:      Head: Normocephalic and atraumatic.      Right Ear: Tympanic membrane, ear canal and external ear normal.      Left Ear: Tympanic membrane, ear canal and external ear normal.      Mouth/Throat:      Mouth: Mucous membranes are moist.      Pharynx: Oropharynx is clear.   Eyes:      Conjunctiva/sclera: Conjunctivae normal.   Cardiovascular:      Rate and Rhythm: Normal rate and regular rhythm.      Heart sounds: Normal heart sounds. No murmur heard.     No friction rub. No gallop.   Pulmonary:      Effort: Pulmonary effort is normal. No respiratory distress.      Breath sounds: Normal breath sounds. No stridor. No wheezing or rales.   Abdominal:      Palpations: Abdomen is soft.      Tenderness: There is no abdominal tenderness.   Musculoskeletal:         General: No swelling.      Right lower leg: No edema.      Left lower leg: No edema.   Skin:     Findings: No rash.   Neurological:      General: No focal deficit present.      Mental Status: He is alert. Mental status is at baseline.   Psychiatric:         Mood and Affect: Mood normal.         Behavior: Behavior normal.         Thought Content: Thought content normal.         Judgment: Judgment normal.         "

## 2025-01-14 ENCOUNTER — TELEPHONE (OUTPATIENT)
Age: 70
End: 2025-01-14

## 2025-01-14 NOTE — TELEPHONE ENCOUNTER
Pt called in and said he asked for the wrong things. He states he needs the lab results from 1/8/25 printed please. He will come in to get them. Thanks.

## 2025-01-27 DIAGNOSIS — Z12.5 ENCOUNTER FOR PROSTATE CANCER SCREENING: ICD-10-CM

## 2025-01-27 RX ORDER — TAMSULOSIN HYDROCHLORIDE 0.4 MG/1
0.4 CAPSULE ORAL DAILY
Qty: 90 CAPSULE | Refills: 1 | Status: SHIPPED | OUTPATIENT
Start: 2025-01-27

## 2025-01-27 NOTE — TELEPHONE ENCOUNTER
Reason for call:   [x] Refill   [] Prior Auth  [] Other:     Office:   [] PCP/Provider -   [x] Specialty[facility]  Authorized By: Amanda Novak MD      Medication:       tamsulosin (FLOMAX) 0.4 mg 0.4 mg, Daily       [facility] [place] [facility] [address] [phone]   [facility] [place] - [address] [phone]       Does the patient have enough for 3 days?   [] Yes   [x] No - Send as HP to POD

## 2025-03-18 DIAGNOSIS — I10 ESSENTIAL HYPERTENSION: ICD-10-CM

## 2025-03-18 NOTE — TELEPHONE ENCOUNTER
Reason for call:   [x] Refill   [] Prior Auth  [] Other:     Office:   [x] PCP/Provider -   Ordering Department:  PRIMARY [facility] POD  Authorized By: Amanda Novak MD  [] Specialty/Provider -     Medication: losartan (COZAAR) 50 mg tablet    Dose/Frequency: Take 1 tablet (50 mg total) by mouth daily     Quantity: 90    Pharmacy: [facility] [place] - [address] [phone]    Local Pharmacy   Does the patient have enough for 3 days?   [x] Yes   [] No - Send as HP to POD    Mail Away Pharmacy   Does the patient have enough for 10 days?   [] Yes   [] No - Send as HP to POD

## 2025-03-19 RX ORDER — LOSARTAN POTASSIUM 50 MG/1
50 TABLET ORAL DAILY
Qty: 90 TABLET | Refills: 1 | Status: SHIPPED | OUTPATIENT
Start: 2025-03-19

## 2025-07-14 DIAGNOSIS — Z12.5 ENCOUNTER FOR PROSTATE CANCER SCREENING: ICD-10-CM

## 2025-07-14 NOTE — TELEPHONE ENCOUNTER
Reason for call:   [x] Refill   [] Prior Auth  [] Other:     Office:   [x] PCP/Provider -   [] Specialty/Provider -     Medication: Tamsulosin    Dose/Frequency: 0.4 mg capsule taken by mouth once daily     Quantity: 90    [id number] [place] [facility] [address] [phone]     Local Pharmacy   Does the patient have enough for 3 days?   [x] Yes   [] No - Send as HP to POD    Mail Away Pharmacy   Does the patient have enough for 10 days?   [] Yes   [] No - Send as HP to POD

## 2025-07-16 RX ORDER — TAMSULOSIN HYDROCHLORIDE 0.4 MG/1
0.4 CAPSULE ORAL DAILY
Qty: 90 CAPSULE | Refills: 1 | Status: SHIPPED | OUTPATIENT
Start: 2025-07-16